# Patient Record
Sex: FEMALE | Race: WHITE | NOT HISPANIC OR LATINO | URBAN - METROPOLITAN AREA
[De-identification: names, ages, dates, MRNs, and addresses within clinical notes are randomized per-mention and may not be internally consistent; named-entity substitution may affect disease eponyms.]

---

## 2023-09-06 ENCOUNTER — HOSPITAL ENCOUNTER (OUTPATIENT)
Dept: DATA CONVERSION | Facility: HOSPITAL | Age: 48
Discharge: HOME | End: 2023-09-06

## 2023-09-06 DIAGNOSIS — Z11.51 ENCOUNTER FOR SCREENING FOR HUMAN PAPILLOMAVIRUS (HPV): ICD-10-CM

## 2023-09-06 DIAGNOSIS — Z01.419 ENCOUNTER FOR GYNECOLOGICAL EXAMINATION (GENERAL) (ROUTINE) WITHOUT ABNORMAL FINDINGS: ICD-10-CM

## 2024-04-10 ENCOUNTER — OFFICE VISIT (OUTPATIENT)
Dept: SPORTS MEDICINE | Facility: CLINIC | Age: 49
End: 2024-04-10
Payer: COMMERCIAL

## 2024-04-10 ENCOUNTER — HOSPITAL ENCOUNTER (OUTPATIENT)
Dept: RADIOLOGY | Facility: CLINIC | Age: 49
Discharge: HOME | End: 2024-04-10
Payer: COMMERCIAL

## 2024-04-10 VITALS
HEART RATE: 88 BPM | BODY MASS INDEX: 28.79 KG/M2 | SYSTOLIC BLOOD PRESSURE: 118 MMHG | DIASTOLIC BLOOD PRESSURE: 78 MMHG | HEIGHT: 68 IN | WEIGHT: 190 LBS

## 2024-04-10 DIAGNOSIS — S76.311A HAMSTRING STRAIN, RIGHT, INITIAL ENCOUNTER: ICD-10-CM

## 2024-04-10 DIAGNOSIS — S76.011A MUSCLE STRAIN OF RIGHT GLUTEAL REGION, INITIAL ENCOUNTER: ICD-10-CM

## 2024-04-10 DIAGNOSIS — S76.011A STRAIN OF FLEXOR MUSCLE OF RIGHT HIP, INITIAL ENCOUNTER: ICD-10-CM

## 2024-04-10 DIAGNOSIS — M21.70 LEG LENGTH DISCREPANCY: ICD-10-CM

## 2024-04-10 DIAGNOSIS — M79.661 PAIN IN RIGHT LOWER LEG: ICD-10-CM

## 2024-04-10 DIAGNOSIS — S76.111A QUADRICEPS STRAIN, RIGHT, INITIAL ENCOUNTER: ICD-10-CM

## 2024-04-10 PROCEDURE — 99203 OFFICE O/P NEW LOW 30 MIN: CPT | Performed by: NURSE PRACTITIONER

## 2024-04-10 PROCEDURE — 99213 OFFICE O/P EST LOW 20 MIN: CPT | Performed by: NURSE PRACTITIONER

## 2024-04-10 PROCEDURE — 73502 X-RAY EXAM HIP UNI 2-3 VIEWS: CPT | Mod: RIGHT SIDE | Performed by: RADIOLOGY

## 2024-04-10 PROCEDURE — 73502 X-RAY EXAM HIP UNI 2-3 VIEWS: CPT | Mod: RT

## 2024-04-10 RX ORDER — MEDROXYPROGESTERONE ACETATE 150 MG/ML
150 INJECTION, SUSPENSION INTRAMUSCULAR
COMMUNITY

## 2024-04-10 RX ORDER — CYCLOBENZAPRINE HCL 10 MG
10 TABLET ORAL 2 TIMES DAILY PRN
COMMUNITY
Start: 2024-04-08

## 2024-04-10 RX ORDER — DOXYCYCLINE 50 MG/1
50 TABLET ORAL DAILY
COMMUNITY

## 2024-04-10 RX ORDER — PREDNISONE 20 MG/1
20 TABLET ORAL DAILY
COMMUNITY
Start: 2024-04-08

## 2024-04-10 ASSESSMENT — PAIN - FUNCTIONAL ASSESSMENT: PAIN_FUNCTIONAL_ASSESSMENT: 0-10

## 2024-04-10 ASSESSMENT — ENCOUNTER SYMPTOMS
MYALGIAS: 1
DEPRESSION: 0
CARDIOVASCULAR NEGATIVE: 1
ARTHRALGIAS: 1
LOSS OF SENSATION IN FEET: 0
RESPIRATORY NEGATIVE: 1
OCCASIONAL FEELINGS OF UNSTEADINESS: 0

## 2024-04-10 ASSESSMENT — PAIN DESCRIPTION - DESCRIPTORS: DESCRIPTORS: ACHING;DISCOMFORT;TIGHTNESS

## 2024-04-10 ASSESSMENT — PAIN SCALES - GENERAL: PAINLEVEL_OUTOF10: 5 - MODERATE PAIN

## 2024-04-10 ASSESSMENT — PATIENT HEALTH QUESTIONNAIRE - PHQ9
SUM OF ALL RESPONSES TO PHQ9 QUESTIONS 1 AND 2: 0
2. FEELING DOWN, DEPRESSED OR HOPELESS: NOT AT ALL
1. LITTLE INTEREST OR PLEASURE IN DOING THINGS: NOT AT ALL

## 2024-04-10 NOTE — LETTER
April 10, 2024     Patient: Kristen Becker   YOB: 1975   Date of Visit: 4/10/2024       To Whom It May Concern:    It is my medical opinion that Kristen Becker may return to work on 4/22 .    If you have any questions or concerns, please don't hesitate to call.         Sincerely,        Milind Sinclair, LAUREN-CNP    CC: No Recipients

## 2024-04-10 NOTE — PATIENT INSTRUCTIONS
May use PRICE therapy as needed.,   Start into Physical Therapy 1-2 times a week for 8-10 weeks with manual therapy as well as dry needling and IASTM,   Stressed the importance of wearing shoes with good stability control to help with the biomechanics affecting the lower legs,   Stressed the importance of wearing full foot insoles to help with the biomechanics affecting the lower legs,   Recommendation over-the-counter calcium 500mg, 3 times a day with vitamin-D3 7629-0167+ milligrams a day with food as well as a daily multivitamin,   Recommendation over-the-counter Move Free for joint health,   May take OTC Tylenol Extra Strength or OTC Tylenol Arthritis, taking one every 6-8 hours with food as needed for pain management.,   Patient advised regarding the risk and/or potential adverse reactions and/or side effects of any prescribed medications along with any over-the-counter medications or any supplements used. Patient advised to seek immediate medical care if any adverse reactions occur. The patient and/or patient(s) parent(s) verbalized their understanding.,   Discussed in detail with the patient to the level of their understanding the possibility in the future of regenerative injections versus corticosteroids injections,   Possibility in future of MRI of RIGHT HIPto rule out tendon vs ligament vs tear vs fracture vs other, MSK to read.,   Possibility in future of MRI of LUMBAR spine to rule out disc herniation vs fracture vs other. MSK to read,   Work Restrictions-Out of work until 4/22 when she may return to work  Follow up 4 weeks

## 2024-04-10 NOTE — PROGRESS NOTES
New patient  History Of Present Illness  Kristen Becker is a 48 y.o. female presenting with RIGHT leg pain. 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 She has been off work this week due to pain. Pt states that her pain has been bothering her for the past two months. States that she woke up one morning and had tightness. Pain is located from her glute and radiates down in to her foot. She feels that it is not sciatica since she feels that the pain is more tightness and also that it can be stress related. It is effecting her gait to the point that she has to take smaller steps. She went [place]  on Monday and was given steroid shots but nothing else. Denies any numbness or tingling. No previous history of leg injury but notes that she has a herniated lumbar disc. Rates current pain as a 5/10 describing it as a constant tightness and ache. Her pain can get up to a 9-10/10 feeling like a sharp stabbing pain.  We discussed treatment options.  Upon exam patient has indications of a quad hamstring low back and glutes strain.  As such after discussion we will start patient into physical therapy and obtain some x-rays to evaluate for underlying pathology.  Discussed work restrictions with patient and we will have her off for the next 2 weeks to rest and then can provide more specific restrictions as needed.  Patient can follow-up in 4 to 6 weeks and we can reevaluate at that time.  Patient verbalizes understanding and agreement with plan of care.    Past Medical History  She has a past medical history of Carpal tunnel syndrome, bilateral and Lumbar herniated disc.    She has no past medical history of Spondylolisthesis.    Surgical History  She has a past surgical history that includes Carpal tunnel release.     Social History  She reports that she has never smoked. She has never used smokeless tobacco. She reports that she does not drink alcohol and does not use drugs.    Family History  Family History   Problem  "Relation Name Age of Onset    Colon cancer Mother      Lymphoma Father          Allergies  Patient has no known allergies.    Review of Systems  Review of Systems   Respiratory: Negative.     Cardiovascular: Negative.    Musculoskeletal:  Positive for arthralgias and myalgias.   All other systems reviewed and are negative.       Last Recorded Vitals  /78 (BP Location: Right arm, Patient Position: Sitting, BP Cuff Size: Adult)   Pulse 88   Ht 1.727 m (5' 8\")   Wt 86.2 kg (190 lb)   BMI 28.89 kg/m²      Examination:  Right Hip  Edema: Positive.   Ecchymosis/Bruising: Negative.   Percussion Test: Negative.   Tuning Fork Test: Negative.   Orientation: Symmetrical.     ROM:   Positive Internal Rotation (30-35 degrees) decreased, causes pain  Positive External Rotation (45-60 degrees) decreased, causes pain  Positive Flexion (120 degrees) decreased, causes pain  Positive Extension (15-30 degrees) decreased, causes pain  Positive ABduction (45-50 degrees) decreased, causes pain  Positive ADduction (20-30 degrees) decreased, causes pain  Positive Decreased Sacral Flexion and Sacral Extension.            Muscle Strength:   Positive +4/+5 Hamstring Flexion  Positive +3/+5 Quadricep Extension         Positive +3/+5 Hip Flexion Causes pain  Positive +3/+5Hip Extension Causes pain                  Positive +4/+5 Hip ABduction away from body Causes pain  Positive +3/+5 Hip ADduction towards body Causes pain          Positive +3/+5 Hip Internal Rotation at 90 Degrees Causes pain  Positive +3/+5 Hip External Rotation at 90 Degrees Causes pain                  Positive +3/+5 Hip Internal Rotation at 0 Degrees Causes pain  Positive +3/+5 Hip External Rotation at 0 Degrees Causes pain    DTR/Neurological:  Sensation Intact, 2-Point Discrimination: Negative.            Palpation:  Positive  tender to palpation over Right  Hip Flexor and Hip Joint           Vascular:   Negative: Normal Pulses   +2/+4, Femoral Artery, DP, " PT  Capillary Refill < 2 seconds.            Function Tests:  Test for Rectus Femoris Contracture: Negative  Hip Extension Test: Positive    Iliotibial Tract Test: Negative  Atul :  Positive    Atul Test:  Positive    Hailey Test: Negative             Hip/Pelvis - Flexibility:  Hamstring Positive   Hip Flexor  Positive    IT-Band  Positive          Hip/Pelvis - Hip Contractures:  Finger Tip Test: Negative.   Atul Test: Negative.   Atul  Test: Negative.   Piriformis Test: Negative.     Hip/Pelvis - Hip Dysplasia:  Trochanter Irritation Sign: Negative.   Galeazzi Test: Negative.   Kalchschmidt Test: Negative.            Hip/Pelvis - Impingement/Labrum:  BING Test:  Positive         FADIR Test:  Positive     Hip Scouring Test:  Negative    Carson's Test:   Negative    Posterior Labrum (Posterior Margin) Test:  Negative      Posterior Impingement (Posterior Labrum) [Torque] Test:  Negative     Anterior Impingement (Anterior Labrum) Test: Negative  Psoas Sign:  Negative                 Hip/Pelvis - IT Band Syndrome:  Hailey's (Iliotibial Tract) Test: Negative.   Yinka Compression Test: Negative.   J-Sign: Negative.            Hip/Pelvis - SFE:  Drehmann Test: Negative.            Hip/Pelvis - Trochanteric/Pelvis Muscle Function:  Trendelenburg/Duchenne Sign: Negative.   Duchenne Sign: Negative.      Examination:  Right Lumbar Spine  Edema: Negative.   TART Findings: Positive  Tissue Texture Changes, Asymmetry, Restriction, Tenderness paraspinal muscles lower lumbar spine.   Ecchymosis/Bruising: Negative.   Percussion Test (LUMBAR): Negative.   Tuning Fork Test (LUMBAR): Negative.   Percussion (Sacrum): Negative.   Tuning Fork (Sacrum): Negative.     Orientation:  Orientation (LUMBAR): Positive  Decreased Lumbar Lordosis due to muscle spasms.   Orientation (Sacrum):  Positive  Decreased Sacral Flexion/Extension.     ROM (LUMBAR):   Positive  Decreased due to pain, Forward Flexion, Extension, Lateral Bending  (Side Bending), and Twisting (Rotation).     Sacrum:  Standing Flexion Test: Negative   Seated Flexion Test: Negative  Spring Test: Negative   Sacral Somatic Dysfunction: Negative  Hip Flexor Tightness: Negative  Hamstring Tightness: Negative    Muscle Strength:   Positive +4/+5 Hamstring Flexion  Positive +3/+5 Quadricep Extension         Positive +3/+5 Hip Flexion Causes pain  Positive +3/+5Hip Extension Causes pain                  Positive +4/+5 Hip ABduction away from body Causes pain  Positive +3/+5 Hip ADduction towards body Causes pain          Positive +3/+5 Hip Internal Rotation at 90 Degrees Causes pain  Positive +3/+5 Hip External Rotation at 90 Degrees Causes pain                  Positive +3/+5 Hip Internal Rotation at 0 Degrees Causes pain  Positive +3/+5 Hip External Rotation at 0 Degrees Causes pain           DTR/Neurological: 2-Point Discrimination:  Negative,Toe Walk normal,Heel Walk normal   +2/+4 Patellar Reflex (L-4)  +2/+4 Posterior Tibialis and Medial Hamstrings Reflex (L5)  +2/+4 Achilles Reflex (S-1).            Sensation/Neurological Lumbar:   Negative Sensation Intact, 2-Point Discrimination    Negative L1: Low back, hips, and groin  Negative L2: Low back and front of inside of upper leg/thigh  Negative L3: Low back and front of upper leg/thigh  Negative L4: Low back, front of upper leg/thigh, front of lower leg/calf, front of medial area of knee, and inside of ankle  Negative L5: Low back, front and lateral knee, front and outside of lower leg/calf, top and bottom of foot and first four toes especially big toe.            Sensation/Neurological Sacrum:   Negative  Sensation Intact, 2 -Point Discrimination Test:    Negative S1: low back, back of upper leg/thigh, back and inside of lower leg, calf and little toe  Negative S2: Buttocks, genitals, back of upper leg/thigh and calves  Negative S3: Buttocks and genitals  Negative S4: Buttocks  Negative S5: Buttocks.     Sensation/Neurological  Coccygeal:   Negative Sensation Intact, 2-Point Discrimination:    Negative Coccyx: Buttocks and area of tailbone.     Palpation: Positive  Tender to Palpation over the Right Lumbar Spine as well as the Paraspinal Musculature,           Vascular:   Capillary Refill < 2 seconds  +2/+4Carotid  +2/+4 Dorsalis Pedis  +2/+4 Posterior Tibial.                Low Back-Disc Injury:  Valsalva Maneuver: Negative.   Fermoral Nerve Traction Test: Negative  Slump Test:  Negative  Cross Test Seated:  Negative  Seated Straight Leg Raise: Negative  Laseague Sign: Negative  Laseague Differential Test: Negative  Laseague Drop Test: Negative  Seated Laseague Test: Negative  Reverse Laseague Test:  Negative  Tip Toe Heel Walking Test: Negative.   Ryan Prone Knee Flexion Test: Negative       Low Back-SI Joint:  Three-Phase Hyperextension Test: Negative  Yeoman Test: Negative.   Sacroilliac Stress Test: Negative  Abduction Stress Test: Negative       Low Back-Spondy:  Stork Test: Negative.   Sphinx Test: Negative.   Modified Sphinx Test: Negative.            Leg Length:  Leg Length Supine: Positive Will verify with standing erect pelvis xray   Leg Length Supine to Seated (Derbolowsky Sign): Positive Will verify with standing erect pelvis xray     Feet/Foot:   Positive  BILATERAL Valgus foot     Imaging and Diagnostics Review:  XRAYs ordered during today's visit    Assessment   1. Pain in right lower leg    2. Muscle strain of right gluteal region, initial encounter    3. Quadriceps strain, right, initial encounter    4. Hamstring strain, right, initial encounter    5. Strain of flexor muscle of right hip, initial encounter    6. Leg length discrepancy        Plan   Treatment or Intervention:  May use PRICE therapy as needed.,   Start into Physical Therapy 1-2 times a week for 8-10 weeks with manual therapy as well as dry needling and IASTM,   Stressed the importance of wearing shoes with good stability control to help with the  biomechanics affecting the lower legs,   Stressed the importance of wearing full foot insoles to help with the biomechanics affecting the lower legs,   Recommendation over-the-counter calcium 500mg, 3 times a day with vitamin-D3 5292-4518+ milligrams a day with food as well as a daily multivitamin,   Recommendation over-the-counter Move Free for joint health,   May take OTC Tylenol Extra Strength or OTC Tylenol Arthritis, taking one every 6-8 hours with food as needed for pain management.,   Patient advised regarding the risk and/or potential adverse reactions and/or side effects of any prescribed medications along with any over-the-counter medications or any supplements used. Patient advised to seek immediate medical care if any adverse reactions occur. The patient and/or patient(s) parent(s) verbalized their understanding.,   Discussed in detail with the patient to the level of their understanding the possibility in the future of regenerative injections versus corticosteroids injections,   Possibility in future of MRI of RIGHT to rule out tendon vs ligament vs tear vs fracture vs other, MSK to read.,   Possibility in future of MRI of RIGHT KNEE to rule out tendon vs ligament vs tear vs fracture vs other, MSK to read.,   Possibility in future of MRI of LUMBAR spine to rule out disc herniation vs fracture vs other. MSK to read,     Diagnostic studies:      Activity Instructions, Restrictions, and Accommodations:  Out of work for two weeks.    Consultations/Referrals:  Physical therapy    Follow-up:  Follow up 4 weeks  Total appointment time _30_ minutes. Greater than 50% spent counseling patient on results of physical exam, treatment options as well as reasons for ordered imaging and anticipated treatment for possible results, need for PT and expected outcomes, as well as discussing possible medications.       FELECIA CATES on 4/10/24 at 10:20 AM.     Milind Sinclair CNP

## 2024-04-16 ENCOUNTER — EVALUATION (OUTPATIENT)
Dept: PHYSICAL THERAPY | Facility: CLINIC | Age: 49
End: 2024-04-16
Payer: COMMERCIAL

## 2024-04-16 DIAGNOSIS — S76.111A QUADRICEPS STRAIN, RIGHT, INITIAL ENCOUNTER: ICD-10-CM

## 2024-04-16 DIAGNOSIS — S76.311A HAMSTRING STRAIN, RIGHT, INITIAL ENCOUNTER: ICD-10-CM

## 2024-04-16 DIAGNOSIS — S76.011A STRAIN OF FLEXOR MUSCLE OF RIGHT HIP, INITIAL ENCOUNTER: ICD-10-CM

## 2024-04-16 DIAGNOSIS — M79.661 PAIN IN RIGHT LOWER LEG: ICD-10-CM

## 2024-04-16 DIAGNOSIS — S76.011A MUSCLE STRAIN OF RIGHT GLUTEAL REGION, INITIAL ENCOUNTER: ICD-10-CM

## 2024-04-16 PROCEDURE — 97110 THERAPEUTIC EXERCISES: CPT | Mod: GP | Performed by: PHYSICAL THERAPIST

## 2024-04-16 PROCEDURE — 97161 PT EVAL LOW COMPLEX 20 MIN: CPT | Mod: GP | Performed by: PHYSICAL THERAPIST

## 2024-04-16 PROCEDURE — 97014 ELECTRIC STIMULATION THERAPY: CPT | Mod: GP | Performed by: PHYSICAL THERAPIST

## 2024-04-16 NOTE — PROGRESS NOTES
Physical Therapy Evaluation and Treatment    Patient Name: Kristen Becker  MRN: [id number]  Evaluation Date: 4/16/2024  Time Calculation  Start Time: 1030  Stop Time: 1120  Time Calculation (min): 50 min    Current Problem:   1. Pain in right lower leg  Referral to Physical Therapy    Follow Up In Physical Therapy      2. Muscle strain of right gluteal region, initial encounter  Referral to Physical Therapy      3. Quadriceps strain, right, initial encounter  Referral to Physical Therapy      4. Hamstring strain, right, initial encounter  Referral to Physical Therapy      5. Strain of flexor muscle of right hip, initial encounter  Referral to Physical Therapy          Subjective  /General:     Patient reported hx of current condition/injury: Insidious onset of pain 2 weeks ago with progressive worsening. Has had some prior back issues but nothing like this. Saw Dr and x-ray done. Now referred for PT.    Precautions:  Precautions  Precautions Comment: none  Pain:  Pain Score:  (Pain floats 2-8/10. Not sure of any specific cause for pain or what makes it feel better. does get bworse as the day goes along.)  Pain Location:  (Rt glut and down posterior RLE to heel)  Home Living:   Limitation standing and overhead activities at home. 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    Prior Function Per Pt/Caregiver Report:   Indep home and work    OBJECTIVE:  Objective   Posture:   Good standing posture, Pelvis symm and leg length apears =.  Range of Motion:   Trunk flex and RSB 50% limited by tightness. LSB and ext WFL   Strength:   Grossly 5/5 BLE's except hip ext 4-, knee flex 4, abs 3+  Flexibility:   Tight quads and hip IR's/add's very tight calves and hip flexors  Palpation:   No specific tenderness but feels tight RT. Hamstring area.  Special Tests:   (-) SLR lt but (+) rt, jorge and S-I comp/distraction  Gait:   normal      Outcome Measures:  50% impairment on LEFS     Treatments:  Started  postural educ, anatomy review, E-stim with heat for pain and exer program: PPT, supine sktc/hs stretches and prone glut sets.    HEP to be completed daily, exercises include:  All new exer today.    OP EDUCATION:  Outpatient Education  Individual(s) Educated: Patient  Education Provided: Anatomy, Body Mechanics, Home Exercise Program, Posture  Diagnosis and Precautions: Possible HNP vs DDD  Patient Response to Education: Patient/Caregiver Verbalized Understanding of Information, Patient/Caregiver Performed Return Demonstration of Exercises/Activities, Patient/Caregiver Asked Appropriate Questions    Assessment  PT Assessment Results: Decreased strength, Decreased range of motion, Decreased endurance, Pain  Rehab Prognosis: Good  Evaluation/Treatment Tolerance: Patient tolerated treatment well    Pt is a 48 y.o. Female who presents with impairments of trunk and LE's. These impairments have led to functional limitations including Home, work and recreational activities. Pt would benefit from skilled physical therapy intervention to improve above impairments and facilitate return to function.    Plan  Treatment/Interventions: Education/ Instruction, Electrical stimulation, Hot pack, Manual therapy, Mechanical traction, Neuromuscular re-education, Self care/ home management, Therapeutic exercises, Ultrasound  PT Plan: Skilled PT  PT Frequency: 2 times per week  Duration: 8  Rehab Potential: Good  Plan of Care Agreement: Patient    Goals:  STG:  Improved postural awareness           Home function at least 65%           Tolerate 1/2 day at work without pain  LTG:  Home function at least 90%          Tolerate full work day           Indep with a HEP

## 2024-04-18 ENCOUNTER — TREATMENT (OUTPATIENT)
Dept: PHYSICAL THERAPY | Facility: CLINIC | Age: 49
End: 2024-04-18
Payer: COMMERCIAL

## 2024-04-18 DIAGNOSIS — M79.661 PAIN IN RIGHT LOWER LEG: ICD-10-CM

## 2024-04-18 PROCEDURE — 97014 ELECTRIC STIMULATION THERAPY: CPT | Mod: GP | Performed by: PHYSICAL THERAPIST

## 2024-04-18 PROCEDURE — 97110 THERAPEUTIC EXERCISES: CPT | Mod: GP | Performed by: PHYSICAL THERAPIST

## 2024-04-18 ASSESSMENT — PAIN SCALES - GENERAL: PAINLEVEL_OUTOF10: 1

## 2024-04-18 NOTE — PROGRESS NOTES
Physical Therapy Treatment    Patient Name: Kristen Becker  MRN: [id number]  Today's Date: 2024  Time Calculation  Start Time: 1255  Stop Time: 1340  Time Calculation (min): 45 min    Visit Number:  2 / 10   Visit Authorized:       Current Problem  1. Pain in right lower leg  Follow Up In Physical Therapy          Precautions  Precautions  Precautions Comment: none    Pain  Pain Score: 1    Subjective/General     Feels a little better but still having some pain. HEP good. Supposed to RTW next week but not sure if she is ready.    Objective  Gait normal    Treatment:  Therapeutic Exercise  Therapeutic Exercise Activity 1: stretching/stabilization (Con't with exer as previous and added supine piriformis, prone quad and standing HS/psoas/calf stretches, add. alicia, supine clam shells and prone knee press up.)    Modalities  Modality 1: Untimed ESU - Electrical Stimulation Unattended  Modality 2: Untimed Hotpack    OP EDUCATION:  All new exer's added to HEP     Assessment:   Progrerssed exer without pain increase. Advised pt. To contact  In regards to RTW issues.    End of session pain ratin/10    Plan:     Continue with current POC with the following changes , start strength work    Assessment of current progress against goals:  Symptomatic relief with treatment  Goals:    sleep 5 hrs

## 2024-04-23 ENCOUNTER — APPOINTMENT (OUTPATIENT)
Dept: PHYSICAL THERAPY | Facility: CLINIC | Age: 49
End: 2024-04-23
Payer: COMMERCIAL

## 2024-04-23 ENCOUNTER — DOCUMENTATION (OUTPATIENT)
Dept: PHYSICAL THERAPY | Facility: CLINIC | Age: 49
End: 2024-04-23

## 2024-04-23 NOTE — PROGRESS NOTES
Physical Therapy                 Therapy Communication Note    Patient Name: Kristen Becker  MRN: [id number]  Today's Date: 4/23/2024     Discipline: Physical Therapy    Missed Visit Reason: can't make it in today.     Missed Time: Cancel    Comment:

## 2024-04-25 ENCOUNTER — TREATMENT (OUTPATIENT)
Dept: PHYSICAL THERAPY | Facility: CLINIC | Age: 49
End: 2024-04-25
Payer: COMMERCIAL

## 2024-04-25 DIAGNOSIS — M79.661 PAIN IN RIGHT LOWER LEG: ICD-10-CM

## 2024-04-25 PROCEDURE — 97110 THERAPEUTIC EXERCISES: CPT | Mod: GP | Performed by: PHYSICAL THERAPIST

## 2024-04-25 PROCEDURE — 97014 ELECTRIC STIMULATION THERAPY: CPT | Mod: GP | Performed by: PHYSICAL THERAPIST

## 2024-04-25 ASSESSMENT — PAIN SCALES - GENERAL: PAINLEVEL_OUTOF10: 1

## 2024-04-25 NOTE — PROGRESS NOTES
Physical Therapy Treatment    Patient Name: Kristen Becker  MRN: [id number]  Today's Date: 2024  Time Calculation  Start Time: 1715  Stop Time: 0  Time Calculation (min): 55 min    Visit Number:  3 / 10   Visit Authorized:  20    Current Problem  1. Pain in right lower leg  Follow Up In Physical Therapy          Precautions  Precautions  Precautions Comment: none    Pain  Pain Score: 1    Subjective/General     Overall better. Went back to work and doing ok but gets pretty sore half way through the day. Saw a Chiro. This week who did some manipulations which seemed to help. HEP ok.    Objective  Gait normal    Treatment:  Therapeutic Exercise  Therapeutic Exercise Activity 1: stretching/stabilization  Therapeutic Exercise Activity 2: strengthening (started lat pulls, seated row, knee ext and flex and bike.)    Modalities  Modality 1: Untimed ESU - Electrical Stimulation Unattended  Modality 2: Untimed Hotpack     Assessment:   Able to increase exer program today without pain increase.    End of session pain ratin/10    Plan:     Continue with current POC with the following changes , progress exer as able.    Assessment of current progress against goals:  Progressing toward functional goals  Goals:   Tolerate full work day.

## 2024-04-26 ENCOUNTER — HOSPITAL ENCOUNTER (OUTPATIENT)
Dept: RADIOLOGY | Facility: CLINIC | Age: 49
Discharge: HOME | End: 2024-04-26
Payer: COMMERCIAL

## 2024-04-26 DIAGNOSIS — M51.26 OTHER INTERVERTEBRAL DISC DISPLACEMENT, LUMBAR REGION: ICD-10-CM

## 2024-04-26 PROCEDURE — 72110 X-RAY EXAM L-2 SPINE 4/>VWS: CPT

## 2024-04-26 PROCEDURE — 72110 X-RAY EXAM L-2 SPINE 4/>VWS: CPT | Performed by: RADIOLOGY

## 2024-05-02 ENCOUNTER — DOCUMENTATION (OUTPATIENT)
Dept: PHYSICAL THERAPY | Facility: CLINIC | Age: 49
End: 2024-05-02
Payer: COMMERCIAL

## 2024-05-02 ENCOUNTER — APPOINTMENT (OUTPATIENT)
Dept: PHYSICAL THERAPY | Facility: CLINIC | Age: 49
End: 2024-05-02
Payer: COMMERCIAL

## 2024-05-02 NOTE — PROGRESS NOTES
Physical Therapy                 Therapy Communication Note    Patient Name: Kristen Becker  MRN: [id number]  Today's Date: 2024     Discipline: Physical Therapy    Missed Visit Reason:     Missed Time: Cancel    Comment:

## 2024-05-03 ENCOUNTER — APPOINTMENT (OUTPATIENT)
Dept: SPORTS MEDICINE | Facility: CLINIC | Age: 49
End: 2024-05-03
Payer: COMMERCIAL

## 2024-05-30 ENCOUNTER — DOCUMENTATION (OUTPATIENT)
Dept: PHYSICAL THERAPY | Facility: CLINIC | Age: 49
End: 2024-05-30
Payer: COMMERCIAL

## 2024-05-30 NOTE — PROGRESS NOTES
Physical Therapy    Discharge Summary    Name: Kristen Becker  MRN: [id number]  : 1975  Date: 24    Discharge Summary: PT    Discharge Information:  3    Therapy Summary: Treatment sessions consisted of pain modalities and initiation of an exer program. At time of last visit she was noting some improvement and was scheduled for RTW. She canceled her next arnol't on  and has not rescheduled since nor reported problems.    Discharge Status: discharge at     Rehab Discharge Reason: Failed to schedule and/or keep follow-up appointment(s), was reporting some improvement.

## 2024-10-23 ENCOUNTER — HOSPITAL ENCOUNTER (OUTPATIENT)
Dept: RADIOLOGY | Facility: HOSPITAL | Age: 49
Discharge: HOME | End: 2024-10-23
Payer: COMMERCIAL

## 2024-10-23 VITALS — WEIGHT: 193 LBS | HEIGHT: 68 IN | BODY MASS INDEX: 29.25 KG/M2

## 2024-10-23 DIAGNOSIS — Z12.31 ENCOUNTER FOR SCREENING MAMMOGRAM FOR MALIGNANT NEOPLASM OF BREAST: ICD-10-CM

## 2024-10-23 PROCEDURE — 77067 SCR MAMMO BI INCL CAD: CPT

## 2024-10-23 PROCEDURE — 77063 BREAST TOMOSYNTHESIS BI: CPT | Performed by: RADIOLOGY

## 2024-10-23 PROCEDURE — 77067 SCR MAMMO BI INCL CAD: CPT | Performed by: RADIOLOGY

## 2024-12-03 ENCOUNTER — TRANSCRIBE ORDERS (OUTPATIENT)
Dept: ORTHOPEDIC SURGERY | Facility: HOSPITAL | Age: 49
End: 2024-12-03
Payer: COMMERCIAL

## 2024-12-03 DIAGNOSIS — M54.50 LOW BACK PAIN, UNSPECIFIED BACK PAIN LATERALITY, UNSPECIFIED CHRONICITY, UNSPECIFIED WHETHER SCIATICA PRESENT: ICD-10-CM

## 2024-12-06 ENCOUNTER — APPOINTMENT (OUTPATIENT)
Dept: ORTHOPEDIC SURGERY | Facility: CLINIC | Age: 49
End: 2024-12-06
Payer: COMMERCIAL

## 2024-12-06 ENCOUNTER — HOSPITAL ENCOUNTER (OUTPATIENT)
Dept: RADIOLOGY | Facility: CLINIC | Age: 49
Discharge: HOME | End: 2024-12-06
Payer: COMMERCIAL

## 2024-12-06 DIAGNOSIS — M51.360 DEGENERATION OF INTERVERTEBRAL DISC OF LUMBAR REGION WITH DISCOGENIC BACK PAIN: Primary | ICD-10-CM

## 2024-12-06 DIAGNOSIS — M54.50 LOW BACK PAIN, UNSPECIFIED BACK PAIN LATERALITY, UNSPECIFIED CHRONICITY, UNSPECIFIED WHETHER SCIATICA PRESENT: ICD-10-CM

## 2024-12-06 PROCEDURE — 72110 X-RAY EXAM L-2 SPINE 4/>VWS: CPT

## 2024-12-06 PROCEDURE — 99203 OFFICE O/P NEW LOW 30 MIN: CPT | Performed by: ORTHOPAEDIC SURGERY

## 2024-12-09 NOTE — PROGRESS NOTES
HPI:Kristen Becker is a pleasant 49-year-old woman who comes in today with complaints of low back pain.  She gets intermittent flares of that pain.  She denies leg pain.  No constitutional symptoms.  She has seen a chiropractor in the past has done physical therapy and even had pain management.      ROS:  Reviewed on EMR and patient intake sheet.    PMH/SH:  Reviewed on EMR and patient intake sheet.    Exam:  Physical Exam    Constitutional: Well appearing; no acute distress  Eyes: pupils are equal and round  Psych: normal affect  Respiratory: non-labored breathing  Cardiovascular: regular rate and rhythm  GI: non-distended abdomen  Musculoskeletal: no pain with range of motion of the hips bilaterally  Neurologic: [5]/5 strength in the lower extremities bilaterally]; [negative] straight leg raise    Radiology:     MRI personally reviewed.  There is no evidence of stenosis.  Mild multilevel disc degeneration.    Diagnosis:    Degenerative disc disease    Assessment and Plan:   49-year-old woman with chronic discogenic back pain.  The natural history of disc degeneration was discussed at length.  I stressed that the natural history of disc degeneration is usually favorable, with pain and disability decreasing over time when treated with a multi-modal, focused rehabilitation program.  I encouraged, therefore, continued non-operative care, focusing on core strengthening, regular cardiovascular exercise, abstaining from nicotine products, and maintaining a healthy weight.    No need for surgical intervention at this time.  I can see her back as needed.    The patient was in agreement with the plan. At the end of the visit today, the patient felt that all questions had been answered satisfactorily.  The patient was pleased with the visit and very appreciative for the care rendered.     Thank you very much for the kind referral.  It is a privilege, and a pleasure, to partner with you in the care of your patients.  I  would be delighted to assist you with any further consultations as needed.          Cornel Baxter MD    Chief of Spine Surgery, [facility]  Director of Spine Service, [facility]  , Department of Orthopaedics  [employer]  [address] [place] [address]  P: [phone]  [email or web address]    This note was dictated with voice recognition software.  It has not been proofread for grammatical errors, typographical mistakes or other semantic inconsistencies.

## 2025-01-13 ENCOUNTER — OFFICE VISIT (OUTPATIENT)
Dept: PRIMARY CARE | Facility: CLINIC | Age: 50
End: 2025-01-13
Payer: COMMERCIAL

## 2025-01-13 VITALS
WEIGHT: 193 LBS | HEART RATE: 84 BPM | HEIGHT: 67 IN | BODY MASS INDEX: 30.29 KG/M2 | SYSTOLIC BLOOD PRESSURE: 122 MMHG | DIASTOLIC BLOOD PRESSURE: 78 MMHG

## 2025-01-13 DIAGNOSIS — R39.15 URGENCY OF URINATION: ICD-10-CM

## 2025-01-13 DIAGNOSIS — M25.519 SHOULDER PAIN, UNSPECIFIED CHRONICITY, UNSPECIFIED LATERALITY: ICD-10-CM

## 2025-01-13 DIAGNOSIS — G89.29 CHRONIC RIGHT-SIDED LOW BACK PAIN WITH RIGHT-SIDED SCIATICA: Primary | ICD-10-CM

## 2025-01-13 DIAGNOSIS — M54.41 CHRONIC RIGHT-SIDED LOW BACK PAIN WITH RIGHT-SIDED SCIATICA: Primary | ICD-10-CM

## 2025-01-13 PROCEDURE — 99213 OFFICE O/P EST LOW 20 MIN: CPT | Performed by: FAMILY MEDICINE

## 2025-01-13 PROCEDURE — 1036F TOBACCO NON-USER: CPT | Performed by: FAMILY MEDICINE

## 2025-01-13 PROCEDURE — 3008F BODY MASS INDEX DOCD: CPT | Performed by: FAMILY MEDICINE

## 2025-01-13 RX ORDER — ACETAMINOPHEN 500 MG
TABLET ORAL DAILY
COMMUNITY

## 2025-01-13 RX ORDER — NAPROXEN 500 MG/1
500 TABLET ORAL 2 TIMES DAILY PRN
COMMUNITY

## 2025-01-13 RX ORDER — MULTIVITAMIN/IRON/FOLIC ACID 18MG-0.4MG
1 TABLET ORAL DAILY
COMMUNITY

## 2025-01-13 ASSESSMENT — ENCOUNTER SYMPTOMS
DIARRHEA: 0
CONSTIPATION: 0
HEADACHES: 0
BACK PAIN: 1
SHORTNESS OF BREATH: 0

## 2025-01-13 ASSESSMENT — PATIENT HEALTH QUESTIONNAIRE - PHQ9
1. LITTLE INTEREST OR PLEASURE IN DOING THINGS: NOT AT ALL
2. FEELING DOWN, DEPRESSED OR HOPELESS: NOT AT ALL
SUM OF ALL RESPONSES TO PHQ9 QUESTIONS 1 AND 2: 0

## 2025-01-13 ASSESSMENT — PAIN SCALES - GENERAL: PAINLEVEL_OUTOF10: 2

## 2025-01-13 NOTE — PROGRESS NOTES
"Subjective   Patient ID: Kristen Becker is a 49 y.o. female who presents for Establish Care and Back Pain.    HPI   She presents today for a consultation to consider acupuncture.  She has 5 bulging discs in the back, with symptoms worse in the last week. She does see a chiropractor. In April she started to have pain radiating into the lower right back. Chiropractic care helped, but continued to be a problem. Has seen pain management. Did two injections that did help, but wearing off now.   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  This all started in the fall 2023.  She does not recall anything emotional happening at that time or in April.    In the summertime, she drinks a lot of water, she will have a urgency of urination where she feels that she has to go or can hold it.  Has been a problem over the winter as she has not been drinking as much.  She also has some pain in the shoulder at times.      Review of Systems   Respiratory:  Negative for shortness of breath.    Cardiovascular:  Negative for chest pain.   Gastrointestinal:  Negative for constipation and diarrhea.   Musculoskeletal:  Positive for back pain.   Neurological:  Negative for headaches.       Objective   /78   Pulse 84   Ht 1.702 m (5' 7\")   Wt 87.5 kg (193 lb)   BMI 30.23 kg/m²     Physical Exam  Constitutional:       Appearance: Normal appearance. She is not ill-appearing.   Pulmonary:      Effort: Pulmonary effort is normal.   Musculoskeletal:      Comments: She does have pain with palpation in the lower back, sacral area on the right back.   Neurological:      Mental Status: She is alert.         Assessment/Plan   Diagnoses and all orders for this visit:  Chronic right-sided low back pain with right-sided sciatica  Urgency of urination  Shoulder pain, unspecified chronicity, unspecified laterality  I demonstrated the 4 energy raymond to her which I want to do every day for 4 to 5 minutes each.  I recommend Samuel Bustos " and Mary Moore.  I wanted to follow-up not only the back pain but also the urinary symptoms and the shoulder pain is any of these can get better first but are indication if things are improving.  She can schedule acupuncture but I want her to get least the weekend of doing the energy raymond and the herbs for him.  We discussed that we can then treat that weekly and see how she does.  We also discussed that emotions are very often the root cause of some of these issues.  Specifically with the back issues, can be fear or worry.  She will investigate her life or anything of that nature.

## 2025-01-13 NOTE — PATIENT INSTRUCTIONS
Will treat energetically.   Do the 4 Energy Blanchard every day for 4-5 minutes each.   Take Samuel Bustos and Mary Moore, 3 of each twice a day. You can get these at [facility].   Schedule acupuncture.   Follow not only the back pain, but the urine issue and the shoulder pain. Those should improve as well.   Be sure to let go of any worry in your life.

## 2025-01-24 ENCOUNTER — APPOINTMENT (OUTPATIENT)
Dept: PRIMARY CARE | Facility: CLINIC | Age: 50
End: 2025-01-24
Payer: COMMERCIAL

## 2025-01-24 PROBLEM — M79.672 FOOT PAIN, LEFT: Status: ACTIVE | Noted: 2024-11-18

## 2025-01-24 PROBLEM — M54.50 CHRONIC LOW BACK PAIN: Status: ACTIVE | Noted: 2024-04-08

## 2025-01-24 PROBLEM — G89.29 CHRONIC LOW BACK PAIN: Status: ACTIVE | Noted: 2024-04-08

## 2025-02-07 ENCOUNTER — APPOINTMENT (OUTPATIENT)
Dept: PRIMARY CARE | Facility: CLINIC | Age: 50
End: 2025-02-07
Payer: COMMERCIAL

## 2025-02-11 ENCOUNTER — TELEPHONE (OUTPATIENT)
Dept: PRIMARY CARE | Facility: CLINIC | Age: 50
End: 2025-02-11
Payer: COMMERCIAL

## 2025-02-11 NOTE — TELEPHONE ENCOUNTER
Onset 2 weeks ago with a cough and headache.  She said that the congestion in her head is not breaking up and she is still coughing.  She said that she is using mucinex and it is not really helping.  She is asking if there is something else she can use to help with the congestion  She uses [facility]

## 2025-02-12 ENCOUNTER — OFFICE VISIT (OUTPATIENT)
Dept: PRIMARY CARE | Facility: CLINIC | Age: 50
End: 2025-02-12
Payer: COMMERCIAL

## 2025-02-12 VITALS
HEART RATE: 60 BPM | WEIGHT: 195.8 LBS | BODY MASS INDEX: 30.67 KG/M2 | SYSTOLIC BLOOD PRESSURE: 126 MMHG | DIASTOLIC BLOOD PRESSURE: 70 MMHG

## 2025-02-12 DIAGNOSIS — J01.90 ACUTE SINUSITIS, RECURRENCE NOT SPECIFIED, UNSPECIFIED LOCATION: Primary | ICD-10-CM

## 2025-02-12 PROCEDURE — 99213 OFFICE O/P EST LOW 20 MIN: CPT | Performed by: FAMILY MEDICINE

## 2025-02-12 PROCEDURE — 1036F TOBACCO NON-USER: CPT | Performed by: FAMILY MEDICINE

## 2025-02-12 RX ORDER — AMOXICILLIN AND CLAVULANATE POTASSIUM 875; 125 MG/1; MG/1
875 TABLET, FILM COATED ORAL 2 TIMES DAILY
Qty: 14 TABLET | Refills: 0 | Status: SHIPPED | OUTPATIENT
Start: 2025-02-12 | End: 2025-02-19

## 2025-02-12 ASSESSMENT — PAIN SCALES - GENERAL: PAINLEVEL_OUTOF10: 0-NO PAIN

## 2025-02-12 NOTE — PROGRESS NOTES
Subjective   Patient ID: Kristen Becker is a 49 y.o. female who presents for Cough and Nasal Congestion (X 2 weeks).    HPI   She presents today with 2 weeks of nasal congestion.  She has a little of a cough 2.  His sinus congestion with green discharge.  No fever currently.  She just feels he cannot really clear it.    Review of Systems    Objective   /70   Pulse 60   Wt 88.8 kg (195 lb 12.8 oz)   BMI 30.67 kg/m²     Physical Exam  Vitals reviewed.   Constitutional:       Appearance: She is not toxic-appearing.   HENT:      Right Ear: Tympanic membrane and ear canal normal.      Left Ear: Tympanic membrane and ear canal normal.      Nose: Congestion and rhinorrhea present.      Mouth/Throat:      Mouth: Mucous membranes are moist.      Pharynx: Oropharynx is clear.   Eyes:      Conjunctiva/sclera: Conjunctivae normal.   Pulmonary:      Effort: Pulmonary effort is normal. No respiratory distress.      Breath sounds: No wheezing or rhonchi.   Musculoskeletal:      Cervical back: No rigidity.   Lymphadenopathy:      Cervical: No cervical adenopathy.   Neurological:      Mental Status: She is alert.   Psychiatric:         Mood and Affect: Mood normal.         Assessment/Plan   Diagnoses and all orders for this visit:  Acute sinusitis, recurrence not specified, unspecified location  -     amoxicillin-pot clavulanate (Augmentin) 875-125 mg tablet; Take 1 tablet (875 mg) by mouth 2 times a day for 7 days.  She will let me know she is improving over the next 2 or 3 days.

## 2025-02-14 ENCOUNTER — TELEPHONE (OUTPATIENT)
Dept: PRIMARY CARE | Facility: CLINIC | Age: 50
End: 2025-02-14
Payer: COMMERCIAL

## 2025-02-14 DIAGNOSIS — J01.90 ACUTE SINUSITIS, RECURRENCE NOT SPECIFIED, UNSPECIFIED LOCATION: Primary | ICD-10-CM

## 2025-02-14 RX ORDER — PREDNISONE 20 MG/1
40 TABLET ORAL DAILY
Qty: 10 TABLET | Refills: 0 | Status: SHIPPED | OUTPATIENT
Start: 2025-02-14 | End: 2025-02-19

## 2025-02-14 NOTE — TELEPHONE ENCOUNTER
She was seen on Wednesday and was told to call back if not any better.  She said that she is still completely stuffed up, headache, coughing. She has taken 4 doses of the abx and is not much better  She uses [facility] in [place]

## 2025-03-04 ENCOUNTER — OFFICE VISIT (OUTPATIENT)
Dept: URGENT CARE | Age: 50
End: 2025-03-04
Payer: COMMERCIAL

## 2025-03-04 VITALS
BODY MASS INDEX: 29.55 KG/M2 | OXYGEN SATURATION: 100 % | WEIGHT: 195 LBS | RESPIRATION RATE: 14 BRPM | TEMPERATURE: 98.9 F | HEIGHT: 68 IN | HEART RATE: 96 BPM | DIASTOLIC BLOOD PRESSURE: 87 MMHG | SYSTOLIC BLOOD PRESSURE: 145 MMHG

## 2025-03-04 DIAGNOSIS — R03.0 ELEVATED BLOOD PRESSURE READING IN OFFICE WITHOUT DIAGNOSIS OF HYPERTENSION: ICD-10-CM

## 2025-03-04 DIAGNOSIS — M54.50 ACUTE RIGHT-SIDED LOW BACK PAIN WITHOUT SCIATICA: Primary | ICD-10-CM

## 2025-03-04 PROCEDURE — 3008F BODY MASS INDEX DOCD: CPT

## 2025-03-04 PROCEDURE — 1036F TOBACCO NON-USER: CPT

## 2025-03-04 PROCEDURE — 96372 THER/PROPH/DIAG INJ SC/IM: CPT

## 2025-03-04 PROCEDURE — 99213 OFFICE O/P EST LOW 20 MIN: CPT

## 2025-03-04 RX ORDER — KETOROLAC TROMETHAMINE 30 MG/ML
30 INJECTION, SOLUTION INTRAMUSCULAR; INTRAVENOUS ONCE
Status: COMPLETED | OUTPATIENT
Start: 2025-03-04 | End: 2025-03-04

## 2025-03-04 RX ORDER — PREDNISONE 50 MG/1
50 TABLET ORAL DAILY
Qty: 5 TABLET | Refills: 0 | Status: SHIPPED | OUTPATIENT
Start: 2025-03-04 | End: 2025-03-09

## 2025-03-04 RX ORDER — LIDOCAINE 50 MG/G
1 PATCH TOPICAL DAILY
Qty: 15 PATCH | Refills: 0 | Status: SHIPPED | OUTPATIENT
Start: 2025-03-04 | End: 2025-03-19

## 2025-03-04 RX ORDER — DEXAMETHASONE SODIUM PHOSPHATE 10 MG/ML
10 INJECTION INTRAMUSCULAR; INTRAVENOUS ONCE
Status: COMPLETED | OUTPATIENT
Start: 2025-03-04 | End: 2025-03-04

## 2025-03-04 RX ADMIN — DEXAMETHASONE SODIUM PHOSPHATE 10 MG: 10 INJECTION INTRAMUSCULAR; INTRAVENOUS at 16:25

## 2025-03-04 RX ADMIN — KETOROLAC TROMETHAMINE 30 MG: 30 INJECTION, SOLUTION INTRAMUSCULAR; INTRAVENOUS at 16:26

## 2025-03-04 ASSESSMENT — ENCOUNTER SYMPTOMS: BACK PAIN: 1

## 2025-03-04 NOTE — LETTER
March 4, 2025     Patient: Kristen Becker   YOB: 1975   Date of Visit: 3/4/2025       To Whom It May Concern:    Kristen Becker was seen in my clinic on 3/4/2025 at 3:35 pm. Please excuse Kristen for her absence from work on this day to make the appointment.  Can return to work on Friday, 3/7/2025.    If you have any questions or concerns, please don't hesitate to call.         Sincerely,         Mukesh Palafox PA-C        CC: No Recipients

## 2025-03-04 NOTE — PROGRESS NOTES
"Subjective   Patient ID: Kristen Becker is a 49 y.o. female. They present today with a chief complaint of Back Pain (Lower back pain right side ).    History of Present Illness  49-year-old female presents urgent care for complaint of back pain right side worsening over the past 3 to 4 days.  States she has chronic back pain feels like previous flares.  States she does see pain management.  Denies F/C/N/V/sweats/Cp/SOB/ab pain, trauma, unintentional weight loss/hx of cancer, saddle anesthesia, bowel/bladder incontinence/retention, hx of IV drug use, alcohol abuse, DM.  Gave Toradol and Decadron IM in the urgent care today.  Prescribed prednisone and lidocaine patches.  Patient states muscle relaxants do not help.  Educated on supportive care.  Follow-up PCP in 5 to 7 days for recheck and to recheck blood pressure.  Return/ER precautions.  Patient agrees with plan.      Back Pain        Past Medical History  Allergies as of 03/04/2025    (No Known Allergies)       (Not in a hospital admission)       Past Medical History:   Diagnosis Date    Carpal tunnel syndrome, bilateral     Lumbar herniated disc        Past Surgical History:   Procedure Laterality Date    CARPAL TUNNEL RELEASE          reports that she has never smoked. She has never used smokeless tobacco. She reports that she does not drink alcohol and does not use drugs.    Review of Systems  Review of Systems   Musculoskeletal:  Positive for back pain.   All other systems reviewed and are negative.                                 Objective    Vitals:    03/04/25 1551   BP: 145/87   BP Location: Right arm   Patient Position: Standing   BP Cuff Size: Adult   Pulse: 96   Resp: 14   Temp: 37.2 °C (98.9 °F)   TempSrc: Oral   SpO2: 100%   Weight: 88.5 kg (195 lb)   Height: 1.727 m (5' 8\")     No LMP recorded. Patient has had an injection.    Physical Exam  Vitals reviewed. Exam conducted with a chaperone present.   Constitutional:       General: She is not in " acute distress.     Appearance: Normal appearance. She is not ill-appearing, toxic-appearing or diaphoretic.   HENT:      Head: Normocephalic and atraumatic.      Nose: Nose normal.   Cardiovascular:      Rate and Rhythm: Normal rate and regular rhythm.   Pulmonary:      Effort: Pulmonary effort is normal. No respiratory distress.   Musculoskeletal:      Cervical back: Normal range of motion and neck supple.      Comments: Tenderness right low back/buttock.  No overlying skin changes are performed.  No crepitus to palpation.  Pain does not radiate.  Full range of motion of back with flexion/extension/rotation bilaterally and lateral flexion bilaterally.  Bilateral patellar tendon reflexes intact and symmetric.  Bilateral posterior tibial pulses intact and symmetrical.  No midline tenderness to the C-spine, T-spine, L-spine.  Patient is ambulatory.   Skin:     General: Skin is warm and dry.   Neurological:      General: No focal deficit present.      Mental Status: She is alert and oriented to person, place, and time.      Motor: No weakness.      Gait: Gait normal.      Deep Tendon Reflexes: Reflexes normal.   Psychiatric:         Mood and Affect: Mood normal.         Behavior: Behavior normal.         Procedures    Point of Care Test & Imaging Results from this visit  No results found for this visit on 03/04/25.   No results found.    Diagnostic study results (if any) were reviewed by Mukesh Palafox PA-C.    Assessment/Plan   Allergies, medications, history, and pertinent labs/EKGs/Imaging reviewed by Mukesh Palafox PA-C.     Medical Decision Making  49-year-old female presents urgent care for complaint of back pain right side worsening over the past 3 to 4 days.  States she has chronic back pain feels like previous flares.  States she does see pain management.  Denies F/C/N/V/sweats/Cp/SOB/ab pain, trauma, unintentional weight loss/hx of cancer, saddle anesthesia, bowel/bladder incontinence/retention, hx  of IV drug use, alcohol abuse, DM.  Gave Toradol and Decadron IM in the urgent care today.  Prescribed prednisone and lidocaine patches.  Patient states muscle relaxants do not help.  Educated on supportive care.  Follow-up PCP in 5 to 7 days for recheck and to recheck blood pressure.  Return/ER precautions.  Patient agrees with plan.    Orders and Diagnoses  There are no diagnoses linked to this encounter.    Medical Admin Record      Patient disposition: Home    Electronically signed by Mukesh Palafox PA-C  4:01 PM

## 2025-03-04 NOTE — PATIENT INSTRUCTIONS
Take medications as prescribed.  If symptoms worsen, do not improve, or any other concerning or worrisome symptoms develop please return to the clinic or go to the emergency department.  Follow-up with PCP in 5 to 7 days for recheck of symptoms and blood pressure.

## 2025-03-07 ENCOUNTER — OFFICE VISIT (OUTPATIENT)
Dept: PRIMARY CARE | Facility: CLINIC | Age: 50
End: 2025-03-07

## 2025-03-07 VITALS
WEIGHT: 195.8 LBS | DIASTOLIC BLOOD PRESSURE: 72 MMHG | SYSTOLIC BLOOD PRESSURE: 136 MMHG | HEART RATE: 72 BPM | BODY MASS INDEX: 29.77 KG/M2

## 2025-03-07 DIAGNOSIS — M54.50 CHRONIC LOW BACK PAIN, UNSPECIFIED BACK PAIN LATERALITY, UNSPECIFIED WHETHER SCIATICA PRESENT: Primary | ICD-10-CM

## 2025-03-07 DIAGNOSIS — G89.29 CHRONIC LOW BACK PAIN, UNSPECIFIED BACK PAIN LATERALITY, UNSPECIFIED WHETHER SCIATICA PRESENT: Primary | ICD-10-CM

## 2025-03-07 DIAGNOSIS — R09.81 NASAL CONGESTION: ICD-10-CM

## 2025-03-07 PROCEDURE — 97810 ACUP 1/> WO ESTIM 1ST 15 MIN: CPT | Performed by: FAMILY MEDICINE

## 2025-03-07 PROCEDURE — 1036F TOBACCO NON-USER: CPT | Performed by: FAMILY MEDICINE

## 2025-03-07 PROCEDURE — 97811 ACUP 1/> W/O ESTIM EA ADD 15: CPT | Performed by: FAMILY MEDICINE

## 2025-03-07 ASSESSMENT — COLUMBIA-SUICIDE SEVERITY RATING SCALE - C-SSRS
6. HAVE YOU EVER DONE ANYTHING, STARTED TO DO ANYTHING, OR PREPARED TO DO ANYTHING TO END YOUR LIFE?: NO
1. IN THE PAST MONTH, HAVE YOU WISHED YOU WERE DEAD OR WISHED YOU COULD GO TO SLEEP AND NOT WAKE UP?: NO
2. HAVE YOU ACTUALLY HAD ANY THOUGHTS OF KILLING YOURSELF?: NO

## 2025-03-07 ASSESSMENT — PAIN SCALES - GENERAL: PAINLEVEL_OUTOF10: 5

## 2025-03-07 NOTE — PATIENT INSTRUCTIONS
You can add green dragon, 3 twice a day, and imperial qi, 3 twice a day.  Those help manage distress, and support your energy foundation.  This is where your problem lies.  You can get those at [facility].  As we discussed, thoughts of the need to take forever but for 4 to 6 weeks at least and then see how you do.  You can continue acupuncture once a week as well.  Continue to do the energy raymond, especially the last 2.    Add Flonase or Nasacort, 2 sprays each nostril, once a day. Let me know if not improving.   You can also use Cold Prevention Tea, twice a day for now and then once a day until it's warm again.     We do have a tea called Night Night tea, which can help with sleep.

## 2025-03-07 NOTE — PROGRESS NOTES
sSubjective   Patient ID: Kristen Becker is a 49 y.o. female who presents for acupuncture.    HPI   She presents today for her acupuncture treatment.  She is having pain in the back.  She has mid to being tired frequently.  She does not sleep much and then does not sleep well.  Sometimes he is working 72 hours at work.  People at work are draining her, she states.  It is hard to be there.    She also continues to have nasal congestion.  She used the steroids and the antibiotics and just still is not quite clear.      Review of Systems    Objective   /72   Pulse 72   Wt 88.8 kg (195 lb 12.8 oz)   BMI 29.77 kg/m²     Physical Exam  She is alert, parishes, her affect is pleasant.  Nasal mucosa is swollen but not red.  No cervical lymphadenopathy.  Oromucosa is normal.  Does have some tenderness in the paraspinals of the lower back.    Assessment/Plan   Diagnoses and all orders for this visit:  Chronic low back pain, unspecified back pain laterality, unspecified whether sciatica present  Nasal congestion  She certainly is energy depleted, likely related to the job.  Not all the hours she is working but the people there.  I recommend green dragon SweetSpot WiFiial Chi, 3 of each twice a day.  For the nasal congestion she can certainly use cold prevention tea twice a day until it is resolved then once a day until it warms up again.  She can certainly start Flonase or Nasacort 2 sprays each nostril once a day and see if that does not resolve it.  She let me know.  As far as sleep goes, if she can get more sleep she will sleep better.  Hopefully with the herbs and help her process a lot of is going on she will sleep better.

## 2025-05-27 ENCOUNTER — APPOINTMENT (OUTPATIENT)
Dept: RADIOLOGY | Facility: HOSPITAL | Age: 50
End: 2025-05-27
Payer: MEDICARE

## 2025-05-27 ENCOUNTER — HOSPITAL ENCOUNTER (EMERGENCY)
Facility: HOSPITAL | Age: 50
Discharge: HOME | End: 2025-05-27
Payer: MEDICARE

## 2025-05-27 ENCOUNTER — APPOINTMENT (OUTPATIENT)
Dept: CARDIOLOGY | Facility: HOSPITAL | Age: 50
End: 2025-05-27
Payer: MEDICARE

## 2025-05-27 VITALS
HEIGHT: 68 IN | TEMPERATURE: 99.3 F | BODY MASS INDEX: 29.4 KG/M2 | OXYGEN SATURATION: 99 % | DIASTOLIC BLOOD PRESSURE: 90 MMHG | RESPIRATION RATE: 17 BRPM | WEIGHT: 194 LBS | HEART RATE: 90 BPM | SYSTOLIC BLOOD PRESSURE: 145 MMHG

## 2025-05-27 DIAGNOSIS — I10 HYPERTENSION, UNSPECIFIED TYPE: ICD-10-CM

## 2025-05-27 DIAGNOSIS — S09.90XA CLOSED HEAD INJURY, INITIAL ENCOUNTER: ICD-10-CM

## 2025-05-27 DIAGNOSIS — R91.8 PULMONARY NODULES: ICD-10-CM

## 2025-05-27 DIAGNOSIS — M25.531 RIGHT WRIST PAIN: ICD-10-CM

## 2025-05-27 DIAGNOSIS — R07.9 CHEST PAIN, UNSPECIFIED TYPE: ICD-10-CM

## 2025-05-27 DIAGNOSIS — S16.1XXA CERVICAL STRAIN, ACUTE, INITIAL ENCOUNTER: ICD-10-CM
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
## 2025-05-27 LAB
ALBUMIN SERPL BCP-MCNC: 4.3 G/DL (ref 3.4–5)
ALP SERPL-CCNC: 55 U/L (ref 33–110)
ALT SERPL W P-5'-P-CCNC: 18 U/L (ref 7–45)
ANION GAP SERPL CALCULATED.3IONS-SCNC: 13 MMOL/L (ref 10–20)
AST SERPL W P-5'-P-CCNC: 16 U/L (ref 9–39)
BASOPHILS # BLD AUTO: 0.05 X10*3/UL (ref 0–0.1)
BASOPHILS NFR BLD AUTO: 0.4 %
BILIRUB SERPL-MCNC: 0.4 MG/DL (ref 0–1.2)
BUN SERPL-MCNC: 13 MG/DL (ref 6–23)
CALCIUM SERPL-MCNC: 9.5 MG/DL (ref 8.6–10.3)
CARDIAC TROPONIN I PNL SERPL HS: 3 NG/L (ref 0–13)
CHLORIDE SERPL-SCNC: 105 MMOL/L (ref 98–107)
CO2 SERPL-SCNC: 25 MMOL/L (ref 21–32)
CREAT SERPL-MCNC: 0.75 MG/DL (ref 0.5–1.05)
EGFRCR SERPLBLD CKD-EPI 2021: >90 ML/MIN/1.73M*2
EOSINOPHIL # BLD AUTO: 0.07 X10*3/UL (ref 0–0.7)
EOSINOPHIL NFR BLD AUTO: 0.6 %
ERYTHROCYTE [DISTWIDTH] IN BLOOD BY AUTOMATED COUNT: 12.2 % (ref 11.5–14.5)
GLUCOSE SERPL-MCNC: 102 MG/DL (ref 74–99)
HCT VFR BLD AUTO: 41.8 % (ref 36–46)
HGB BLD-MCNC: 13.7 G/DL (ref 12–16)
IMM GRANULOCYTES # BLD AUTO: 0.26 X10*3/UL (ref 0–0.7)
IMM GRANULOCYTES NFR BLD AUTO: 2.3 % (ref 0–0.9)
LYMPHOCYTES # BLD AUTO: 1.49 X10*3/UL (ref 1.2–4.8)
LYMPHOCYTES NFR BLD AUTO: 13.2 %
MCH RBC QN AUTO: 29.3 PG (ref 26–34)
MCHC RBC AUTO-ENTMCNC: 32.8 G/DL (ref 32–36)
MCV RBC AUTO: 90 FL (ref 80–100)
MONOCYTES # BLD AUTO: 0.84 X10*3/UL (ref 0.1–1)
MONOCYTES NFR BLD AUTO: 7.4 %
NEUTROPHILS # BLD AUTO: 8.6 X10*3/UL (ref 1.2–7.7)
NEUTROPHILS NFR BLD AUTO: 76.1 %
NRBC BLD-RTO: 0 /100 WBCS (ref 0–0)
PLATELET # BLD AUTO: 282 X10*3/UL (ref 150–450)
POTASSIUM SERPL-SCNC: 4.2 MMOL/L (ref 3.5–5.3)
PROT SERPL-MCNC: 6.9 G/DL (ref 6.4–8.2)
RBC # BLD AUTO: 4.67 X10*6/UL (ref 4–5.2)
SODIUM SERPL-SCNC: 139 MMOL/L (ref 136–145)
WBC # BLD AUTO: 11.3 X10*3/UL (ref 4.4–11.3)

## 2025-05-27 PROCEDURE — 73110 X-RAY EXAM OF WRIST: CPT | Mod: RIGHT SIDE | Performed by: RADIOLOGY

## 2025-05-27 PROCEDURE — 71260 CT THORAX DX C+: CPT | Mod: FOREIGN READ | Performed by: RADIOLOGY

## 2025-05-27 PROCEDURE — 36415 COLL VENOUS BLD VENIPUNCTURE: CPT | Performed by: PHYSICIAN ASSISTANT

## 2025-05-27 PROCEDURE — 2550000001 HC RX 255 CONTRASTS: Performed by: PHYSICIAN ASSISTANT

## 2025-05-27 PROCEDURE — 80053 COMPREHEN METABOLIC PANEL: CPT | Performed by: PHYSICIAN ASSISTANT

## 2025-05-27 PROCEDURE — 96372 THER/PROPH/DIAG INJ SC/IM: CPT | Performed by: PHYSICIAN ASSISTANT

## 2025-05-27 PROCEDURE — 74177 CT ABD & PELVIS W/CONTRAST: CPT

## 2025-05-27 PROCEDURE — 70450 CT HEAD/BRAIN W/O DYE: CPT | Performed by: RADIOLOGY

## 2025-05-27 PROCEDURE — 74177 CT ABD & PELVIS W/CONTRAST: CPT | Mod: FOREIGN READ | Performed by: RADIOLOGY

## 2025-05-27 PROCEDURE — 72125 CT NECK SPINE W/O DYE: CPT | Performed by: RADIOLOGY

## 2025-05-27 PROCEDURE — 73110 X-RAY EXAM OF WRIST: CPT | Mod: RT

## 2025-05-27 PROCEDURE — 84484 ASSAY OF TROPONIN QUANT: CPT | Performed by: PHYSICIAN ASSISTANT

## 2025-05-27 PROCEDURE — 99285 EMERGENCY DEPT VISIT HI MDM: CPT | Mod: 25

## 2025-05-27 PROCEDURE — 2500000004 HC RX 250 GENERAL PHARMACY W/ HCPCS (ALT 636 FOR OP/ED): Mod: JZ | Performed by: PHYSICIAN ASSISTANT

## 2025-05-27 PROCEDURE — 70450 CT HEAD/BRAIN W/O DYE: CPT

## 2025-05-27 PROCEDURE — 71046 X-RAY EXAM CHEST 2 VIEWS: CPT

## 2025-05-27 PROCEDURE — 85025 COMPLETE CBC W/AUTO DIFF WBC: CPT | Performed by: PHYSICIAN ASSISTANT

## 2025-05-27 PROCEDURE — 71046 X-RAY EXAM CHEST 2 VIEWS: CPT | Mod: FOREIGN READ | Performed by: RADIOLOGY

## 2025-05-27 PROCEDURE — 72125 CT NECK SPINE W/O DYE: CPT

## 2025-05-27 PROCEDURE — 96360 HYDRATION IV INFUSION INIT: CPT | Mod: 59

## 2025-05-27 PROCEDURE — 93005 ELECTROCARDIOGRAM TRACING: CPT

## 2025-05-27 PROCEDURE — 96361 HYDRATE IV INFUSION ADD-ON: CPT

## 2025-05-27 RX ORDER — IBUPROFEN 600 MG/1
600 TABLET, FILM COATED ORAL EVERY 6 HOURS PRN
Qty: 15 TABLET | Refills: 0 | Status: SHIPPED | OUTPATIENT
Start: 2025-05-27 | End: 2025-06-03

## 2025-05-27 RX ORDER — ORPHENADRINE CITRATE 30 MG/ML
60 INJECTION INTRAMUSCULAR; INTRAVENOUS ONCE
Status: COMPLETED | OUTPATIENT
Start: 2025-05-27 | End: 2025-05-27

## 2025-05-27 RX ORDER — LIDOCAINE 50 MG/G
1 PATCH TOPICAL DAILY
Qty: 7 PATCH | Refills: 0 | Status: SHIPPED | OUTPATIENT
Start: 2025-05-27

## 2025-05-27 RX ORDER — CYCLOBENZAPRINE HCL 10 MG
10 TABLET ORAL 2 TIMES DAILY PRN
Qty: 12 TABLET | Refills: 0 | Status: SHIPPED | OUTPATIENT
Start: 2025-05-27 | End: 2025-06-06

## 2025-05-27 RX ADMIN — IOHEXOL 75 ML: 350 INJECTION, SOLUTION INTRAVENOUS at 14:52

## 2025-05-27 RX ADMIN — ORPHENADRINE CITRATE 60 MG: 30 INJECTION, SOLUTION INTRAMUSCULAR; INTRAVENOUS at 12:30

## 2025-05-27 RX ADMIN — SODIUM CHLORIDE 1000 ML: 900 INJECTION, SOLUTION INTRAVENOUS at 12:30

## 2025-05-27 ASSESSMENT — COLUMBIA-SUICIDE SEVERITY RATING SCALE - C-SSRS
1. IN THE PAST MONTH, HAVE YOU WISHED YOU WERE DEAD OR WISHED YOU COULD GO TO SLEEP AND NOT WAKE UP?: NO
6. HAVE YOU EVER DONE ANYTHING, STARTED TO DO ANYTHING, OR PREPARED TO DO ANYTHING TO END YOUR LIFE?: NO
2. HAVE YOU ACTUALLY HAD ANY THOUGHTS OF KILLING YOURSELF?: NO

## 2025-05-27 ASSESSMENT — PAIN - FUNCTIONAL ASSESSMENT: PAIN_FUNCTIONAL_ASSESSMENT: 0-10

## 2025-05-27 ASSESSMENT — PAIN SCALES - GENERAL
PAINLEVEL_OUTOF10: 0 - NO PAIN
PAINLEVEL_OUTOF10: 5 - MODERATE PAIN
PAINLEVEL_OUTOF10: 7

## 2025-05-27 NOTE — DISCHARGE INSTRUCTIONS
"Return immediately to the emergency department should you develop new different worsening pains or symptoms, you were found to have some pulmonary nodules, these must be further evaluated and monitored, please talk to your family doctor about this.    Thank you for allowing us to take care of you today. While you are home, you might receive a survey about your care in our hospital. Your nurse and myself, Berry Zhong PA-C, would love your honest feedback on how we can improve your care. Your feedback and especially your positive comments help our hospital receive the support we need to continue to serve you and your family. Thank you again for trusting us with your care.\"    Be sure to follow up as directed in 1-2 days.  All of the details of your follow up instructions are detailed in the follow up section of this packet.     If you are being discharged with any pains medications or muscle relaxers (norco, Vicodin, hydrocodone products, Percocet, oxycodone products, flexeril, cyclobenzaprine, robaxin, norflex, brand or generic, or any other pain controlling medications with the exception of Ibuprofen and regular Tylenol, do not drive or operate machinery, climb ladders or participate in any activity that could potentially put yourself or others at risk should you get dizzy, or be/feel impaired at all.        It is important to remember that your care does not end here and you must continue to monitor your condition closely. Please return to the emergency department for any worsening or concerning signs or symptoms as directed by our conversations and the discharge instructions. Otherwise please follow up with your doctor in 2 days if no better or worse. If you do not have a doctor please contact the referral number on your discharge instructions. Please contact any physician specialists provided in your discharge notes as it is very important to follow up with them regarding your condition. If you are unable " to reach the physicians provided, please come back to the Emergency Department at any time.        Return to emergency room without delay for ANY new or worsening pains or for any other symptoms or concerns.
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
## 2025-05-28 ENCOUNTER — TELEPHONE (OUTPATIENT)
Dept: HEMATOLOGY/ONCOLOGY | Facility: CLINIC | Age: 50
End: 2025-05-28
Payer: COMMERCIAL

## 2025-05-28 NOTE — TELEPHONE ENCOUNTER
Patient returned my call. I explained Diagnostic Clinic referral for lung nodules. She denied history of smoking or family history of lung cancer. She states she has known about these nodules for years and has been in a monitoring program for them. At this time, she only requires imaging every 5 years because they have been stable for so long. I explained that our team was not needed then and we would cancel the referral. She is in agreement with plan, no further needs at this time.

## 2025-05-28 NOTE — TELEPHONE ENCOUNTER
Referral placed to [facility] by KARYNA Fenton, [facility] [place] [facility], for few scattered LLL pulmonary nodules measuring up to 4mm, discovered incidentally on CT chest imaging yesterday, 5/27/25. Per guidelines, no additional follow-up needed, unless Hx of smoking or family Hx of lung cancer. If risk factors such as these, recommend follow-up w/ her established PCP for repeat imaging in 12 months to ensure stability.  LAUREN Montoya.CNP  [facility]

## 2025-05-28 NOTE — TELEPHONE ENCOUNTER
Called patient, no answer. Left VM on secure VM stating that Diagnostic Clinic referral had been made by ER team and asked her for call back to discuss further. Diagnostic Clinic phone number given.

## 2025-06-01 LAB
ATRIAL RATE: 82 BPM
P AXIS: 77 DEGREES
P OFFSET: 208 MS
P ONSET: 149 MS
PR INTERVAL: 144 MS
Q ONSET: 221 MS
QRS COUNT: 14 BEATS
QRS DURATION: 76 MS
QT INTERVAL: 360 MS
QTC CALCULATION(BAZETT): 420 MS
QTC FREDERICIA: 399 MS
R AXIS: 81 DEGREES
T AXIS: 63 DEGREES
T OFFSET: 401 MS
VENTRICULAR RATE: 82 BPM

## 2025-06-30 ENCOUNTER — OFFICE VISIT (OUTPATIENT)
Dept: URGENT CARE | Age: 50
End: 2025-06-30
Payer: COMMERCIAL

## 2025-06-30 ENCOUNTER — HOSPITAL ENCOUNTER (EMERGENCY)
Facility: HOSPITAL | Age: 50
Discharge: HOME | End: 2025-06-30
Attending: EMERGENCY MEDICINE
Payer: COMMERCIAL

## 2025-06-30 ENCOUNTER — APPOINTMENT (OUTPATIENT)
Dept: RADIOLOGY | Facility: HOSPITAL | Age: 50
End: 2025-06-30
Payer: COMMERCIAL

## 2025-06-30 VITALS
HEIGHT: 68 IN | OXYGEN SATURATION: 100 % | TEMPERATURE: 97.4 F | DIASTOLIC BLOOD PRESSURE: 90 MMHG | WEIGHT: 195 LBS | RESPIRATION RATE: 18 BRPM | SYSTOLIC BLOOD PRESSURE: 139 MMHG | BODY MASS INDEX: 29.55 KG/M2 | HEART RATE: 72 BPM

## 2025-06-30 VITALS
TEMPERATURE: 98.6 F | DIASTOLIC BLOOD PRESSURE: 82 MMHG | SYSTOLIC BLOOD PRESSURE: 146 MMHG | WEIGHT: 194 LBS | BODY MASS INDEX: 29.5 KG/M2 | RESPIRATION RATE: 18 BRPM | HEART RATE: 78 BPM | OXYGEN SATURATION: 99 %

## 2025-06-30 DIAGNOSIS — N20.1 CALCULUS OF DISTAL LEFT URETER: Primary | ICD-10-CM

## 2025-06-30 DIAGNOSIS — N23 RENAL COLIC ON LEFT SIDE: ICD-10-CM

## 2025-06-30 DIAGNOSIS — R10.9 ABDOMINAL PAIN, UNSPECIFIED ABDOMINAL LOCATION: ICD-10-CM

## 2025-06-30 DIAGNOSIS — R11.2 NAUSEA AND VOMITING, UNSPECIFIED VOMITING TYPE: ICD-10-CM

## 2025-06-30 DIAGNOSIS — R10.32 LEFT LOWER QUADRANT ABDOMINAL PAIN: Primary | ICD-10-CM

## 2025-06-30 LAB
ALBUMIN SERPL BCP-MCNC: 4.5 G/DL (ref 3.4–5)
ALP SERPL-CCNC: 71 U/L (ref 33–110)
ALT SERPL W P-5'-P-CCNC: 18 U/L (ref 7–45)
ANION GAP SERPL CALC-SCNC: 14 MMOL/L (ref 10–20)
APPEARANCE UR: CLEAR
AST SERPL W P-5'-P-CCNC: 22 U/L (ref 9–39)
BASOPHILS # BLD AUTO: 0.06 X10*3/UL (ref 0–0.1)
BASOPHILS NFR BLD AUTO: 0.6 %
BILIRUB SERPL-MCNC: 0.5 MG/DL (ref 0–1.2)
BILIRUB UR STRIP.AUTO-MCNC: NEGATIVE MG/DL
BUN SERPL-MCNC: 13 MG/DL (ref 6–23)
CALCIUM SERPL-MCNC: 9.4 MG/DL (ref 8.6–10.3)
CHLORIDE SERPL-SCNC: 102 MMOL/L (ref 98–107)
CO2 SERPL-SCNC: 25 MMOL/L (ref 21–32)
COLOR UR: ABNORMAL
CREAT SERPL-MCNC: 0.84 MG/DL (ref 0.5–1.05)
EGFRCR SERPLBLD CKD-EPI 2021: 86 ML/MIN/1.73M*2
EOSINOPHIL # BLD AUTO: 0.11 X10*3/UL (ref 0–0.7)
EOSINOPHIL NFR BLD AUTO: 1.1 %
ERYTHROCYTE [DISTWIDTH] IN BLOOD BY AUTOMATED COUNT: 12.2 % (ref 11.5–14.5)
GLUCOSE SERPL-MCNC: 102 MG/DL (ref 74–99)
GLUCOSE UR STRIP.AUTO-MCNC: NORMAL MG/DL
HCG UR QL IA.RAPID: NEGATIVE
HCT VFR BLD AUTO: 38.6 % (ref 36–46)
HGB BLD-MCNC: 12.9 G/DL (ref 12–16)
HYALINE CASTS #/AREA URNS AUTO: ABNORMAL /LPF
IMM GRANULOCYTES # BLD AUTO: 0.06 X10*3/UL (ref 0–0.7)
IMM GRANULOCYTES NFR BLD AUTO: 0.6 % (ref 0–0.9)
KETONES UR STRIP.AUTO-MCNC: NEGATIVE MG/DL
LEUKOCYTE ESTERASE UR QL STRIP.AUTO: NEGATIVE
LIPASE SERPL-CCNC: 25 U/L (ref 9–82)
LYMPHOCYTES # BLD AUTO: 1.79 X10*3/UL (ref 1.2–4.8)
LYMPHOCYTES NFR BLD AUTO: 17.6 %
MCH RBC QN AUTO: 30.4 PG (ref 26–34)
MCHC RBC AUTO-ENTMCNC: 33.4 G/DL (ref 32–36)
MCV RBC AUTO: 91 FL (ref 80–100)
MONOCYTES # BLD AUTO: 0.59 X10*3/UL (ref 0.1–1)
MONOCYTES NFR BLD AUTO: 5.8 %
MUCOUS THREADS #/AREA URNS AUTO: ABNORMAL /LPF
NEUTROPHILS # BLD AUTO: 7.54 X10*3/UL (ref 1.2–7.7)
NEUTROPHILS NFR BLD AUTO: 74.3 %
NITRITE UR QL STRIP.AUTO: NEGATIVE
NRBC BLD-RTO: 0 /100 WBCS (ref 0–0)
PH UR STRIP.AUTO: 6 [PH]
PLATELET # BLD AUTO: 308 X10*3/UL (ref 150–450)
POTASSIUM SERPL-SCNC: 3.6 MMOL/L (ref 3.5–5.3)
PROT SERPL-MCNC: 7.3 G/DL (ref 6.4–8.2)
PROT UR STRIP.AUTO-MCNC: NEGATIVE MG/DL
RBC # BLD AUTO: 4.25 X10*6/UL (ref 4–5.2)
RBC # UR STRIP.AUTO: ABNORMAL MG/DL
RBC #/AREA URNS AUTO: ABNORMAL /HPF
SODIUM SERPL-SCNC: 137 MMOL/L (ref 136–145)
SP GR UR STRIP.AUTO: 1.01
SQUAMOUS #/AREA URNS AUTO: ABNORMAL /HPF
UROBILINOGEN UR STRIP.AUTO-MCNC: NORMAL MG/DL
WBC # BLD AUTO: 10.2 X10*3/UL (ref 4.4–11.3)
WBC #/AREA URNS AUTO: ABNORMAL /HPF
YEAST BUDDING #/AREA UR COMP ASSIST: PRESENT /HPF

## 2025-06-30 PROCEDURE — 81025 URINE PREGNANCY TEST: CPT | Performed by: EMERGENCY MEDICINE

## 2025-06-30 PROCEDURE — 83690 ASSAY OF LIPASE: CPT | Performed by: EMERGENCY MEDICINE

## 2025-06-30 PROCEDURE — 99284 EMERGENCY DEPT VISIT MOD MDM: CPT | Mod: 25 | Performed by: EMERGENCY MEDICINE

## 2025-06-30 PROCEDURE — 74176 CT ABD & PELVIS W/O CONTRAST: CPT

## 2025-06-30 PROCEDURE — 74176 CT ABD & PELVIS W/O CONTRAST: CPT | Performed by: RADIOLOGY

## 2025-06-30 PROCEDURE — 81001 URINALYSIS AUTO W/SCOPE: CPT | Performed by: EMERGENCY MEDICINE

## 2025-06-30 PROCEDURE — 84075 ASSAY ALKALINE PHOSPHATASE: CPT | Performed by: EMERGENCY MEDICINE

## 2025-06-30 PROCEDURE — 36415 COLL VENOUS BLD VENIPUNCTURE: CPT | Performed by: EMERGENCY MEDICINE

## 2025-06-30 PROCEDURE — 85025 COMPLETE CBC W/AUTO DIFF WBC: CPT | Performed by: EMERGENCY MEDICINE

## 2025-06-30 RX ORDER — TAMSULOSIN HYDROCHLORIDE 0.4 MG/1
0.4 CAPSULE ORAL DAILY
Qty: 20 CAPSULE | Refills: 0 | Status: SHIPPED | OUTPATIENT
Start: 2025-06-30 | End: 2025-07-20

## 2025-06-30 RX ORDER — KETOROLAC TROMETHAMINE 10 MG/1
10 TABLET, FILM COATED ORAL EVERY 6 HOURS PRN
Qty: 20 TABLET | Refills: 0 | Status: SHIPPED | OUTPATIENT
Start: 2025-06-30 | End: 2025-07-05

## 2025-06-30 RX ORDER — KETOROLAC TROMETHAMINE 30 MG/ML
30 INJECTION, SOLUTION INTRAMUSCULAR; INTRAVENOUS ONCE
Status: COMPLETED | OUTPATIENT
Start: 2025-06-30 | End: 2025-06-30

## 2025-06-30 RX ORDER — ONDANSETRON 4 MG/1
4 TABLET, ORALLY DISINTEGRATING ORAL ONCE
Status: COMPLETED | OUTPATIENT
Start: 2025-06-30 | End: 2025-06-30

## 2025-06-30 RX ORDER — ONDANSETRON 4 MG/1
4 TABLET, ORALLY DISINTEGRATING ORAL EVERY 8 HOURS PRN
Qty: 15 TABLET | Refills: 0 | Status: SHIPPED | OUTPATIENT
Start: 2025-06-30

## 2025-06-30 RX ADMIN — ONDANSETRON 4 MG: 4 TABLET, ORALLY DISINTEGRATING ORAL at 16:44

## 2025-06-30 RX ADMIN — KETOROLAC TROMETHAMINE 30 MG: 30 INJECTION, SOLUTION INTRAMUSCULAR; INTRAVENOUS at 16:44

## 2025-06-30 ASSESSMENT — PAIN DESCRIPTION - ORIENTATION: ORIENTATION: LEFT

## 2025-06-30 ASSESSMENT — PAIN DESCRIPTION - DESCRIPTORS: DESCRIPTORS: ACHING

## 2025-06-30 ASSESSMENT — PAIN SCALES - GENERAL: PAINLEVEL_OUTOF10: 5 - MODERATE PAIN

## 2025-06-30 ASSESSMENT — PAIN DESCRIPTION - PAIN TYPE: TYPE: ACUTE PAIN

## 2025-06-30 ASSESSMENT — ENCOUNTER SYMPTOMS
DEPRESSION: 0
OCCASIONAL FEELINGS OF UNSTEADINESS: 0
LOSS OF SENSATION IN FEET: 0

## 2025-06-30 ASSESSMENT — PAIN DESCRIPTION - LOCATION: LOCATION: ABDOMEN

## 2025-06-30 ASSESSMENT — PAIN - FUNCTIONAL ASSESSMENT: PAIN_FUNCTIONAL_ASSESSMENT: 0-10

## 2025-06-30 NOTE — PROGRESS NOTES
Subjective   Patient ID: Kristen Becker is a 50 y.o. female. They present today with a chief complaint of Vomiting, Abdominal Pain (10 plus  pain , left lower abdominal pain that radiates into left lower back ), and Nausea.    History of Present Illness  50-year-old female presents urgent care with left lower quadrant abdominal pain states 10 out of 10 radiates to left lower back as well as nausea and vomiting without blood started yesterday but again today several hours ago.  Denies current fevers, chest pain or shortness of breath, focal deficits, rashes.  States she has urinary urgency but is unable to urinate and was unable to produce a urine here at the urgent care.  Denies abdominal surgical history.  Denies history of diverticulitis.  Gave 30 mg Toradol IM and 4 mg ODT Zofran in urgent care today.  Patient was taken to the emergency department via ambulance.  Notified Dr. Bah.          Past Medical History  Allergies as of 06/30/2025    (No Known Allergies)       Prescriptions Prior to Admission[1]     Medical History[2]    Surgical History[3]     reports that she has never smoked. She has never used smokeless tobacco. She reports that she does not drink alcohol and does not use drugs.    Review of Systems  Review of Systems   All other systems reviewed and are negative.                                 Objective    Vitals:    06/30/25 1619   BP: 146/82   Pulse: 78   Resp: 18   Temp: 37 °C (98.6 °F)   TempSrc: Oral   SpO2: 99%   Weight: 88 kg (194 lb)     No LMP recorded. Patient has had an injection.    Physical Exam  Vitals reviewed.   Constitutional:       General: She is not in acute distress.     Appearance: Normal appearance. She is not ill-appearing, toxic-appearing or diaphoretic.   HENT:      Head: Normocephalic and atraumatic.      Nose: Nose normal.      Mouth/Throat:      Mouth: Mucous membranes are moist.      Pharynx: Oropharynx is clear.      Comments: Airway clear.  Cardiovascular:       Rate and Rhythm: Normal rate and regular rhythm.   Pulmonary:      Effort: Pulmonary effort is normal. No respiratory distress.      Breath sounds: Normal breath sounds. No stridor. No wheezing, rhonchi or rales.   Abdominal:      General: Abdomen is flat.      Palpations: Abdomen is soft.      Tenderness: There is abdominal tenderness (Left lower abdominal quadrant tenderness). There is no right CVA tenderness or left CVA tenderness.   Musculoskeletal:      Cervical back: Normal range of motion and neck supple. No rigidity or tenderness.   Lymphadenopathy:      Cervical: No cervical adenopathy.   Skin:     General: Skin is warm and dry.   Neurological:      General: No focal deficit present.      Mental Status: She is alert and oriented to person, place, and time.   Psychiatric:         Mood and Affect: Mood normal.         Behavior: Behavior normal.         Procedures    Point of Care Test & Imaging Results from this visit  No results found for this visit on 06/30/25.   Imaging  No results found.    Cardiology, Vascular, and Other Imaging  No other imaging results found for the past 2 days      Diagnostic study results (if any) were reviewed by Mukesh Palafox PA-C.    Assessment/Plan   Allergies, medications, history, and pertinent labs/EKGs/Imaging reviewed by Mukesh Palafox PA-C.     Medical Decision Making  50-year-old female presents urgent care with left lower quadrant abdominal pain states 10 out of 10 radiates to left lower back as well as nausea and vomiting without blood started yesterday but again today several hours ago.  Denies current fevers, chest pain or shortness of breath, focal deficits, rashes.  States she has urinary urgency but is unable to urinate and was unable to produce a urine here at the urgent care.  Denies abdominal surgical history.  Denies history of diverticulitis.  Gave 30 mg Toradol IM and 4 mg ODT Zofran in urgent care today.  Patient was taken to the emergency  department via ambulance.  Notified Dr. Bah.    Orders and Diagnoses  Diagnoses and all orders for this visit:  Abdominal pain, unspecified abdominal location  -     POCT pregnancy, urine manually resulted  -     POCT UA Automated manually resulted      Medical Admin Record      Patient disposition: ED    Electronically signed by Mukesh Palafox PA-C  4:30 PM           [1] (Not in a hospital admission)  [2]   Past Medical History:  Diagnosis Date    Carpal tunnel syndrome, bilateral     Lumbar herniated disc    [3]   Past Surgical History:  Procedure Laterality Date    CARPAL TUNNEL RELEASE

## 2025-06-30 NOTE — ED PROVIDER NOTES
Department of Emergency Medicine   ED  Provider Note  Admit Date/RoomTime: 6/30/2025  5:10 PM  ED Room: [facility]                  History of Present Illness:   Kristen Becker is a 50 y.o. female presenting to the ED for left lower quadrant abdominal pain and left flank pain, beginning last night.  Patient reports nausea and vomiting around 3 PM.  She went to urgent care and received Toradol and Zofran and was referred to the emergency room.  The complaint has been persistent, moderate in severity, and worsened by nothing.  Patient reports acute onset of some left lower quadrant left flank pain that started around 1030 last night.  She had nausea and vomiting x 1.  Seem to improve by about 1230 or so she was able to sleep last night she got up this morning felt okay went to work started having some pain around 230 then acutely worse again around 3 PM.  Again the pain was in the left lower quadrant seems to radiate around and up to the left flank area.  She had feelings like she needed to urinate but only going small amounts.  No unusual vaginal discharge.  She denies chance of pregnancy she says she is on the Depo shot.  She has had no unusual vaginal discharge.  No fever or chills.  Nothing seems to make the pain better or worse.  She went to urgent care where they did give her shot of Toradol and gave her some Zofran orally.  She is feeling better from nausea vomiting and pain standpoint at this time.      Review of Systems:   Pertinent positives and review of systems as noted above.  Remaining 10 review of systems is negative or noncontributory to today's episode of care.        --------------------------------------------- PAST HISTORY ---------------------------------------------  Past Medical History:  has a past medical history of Carpal tunnel syndrome, bilateral and Lumbar herniated disc.    She has no past medical history of Spondylolisthesis.    Past Surgical History:  has a past surgical history that  includes Carpal tunnel release.    Social History:  reports that she has never smoked. She has never used smokeless tobacco. She reports that she does not drink alcohol and does not use drugs.    Family History: family history includes Colon cancer in her mother; Lymphoma in her father. Unless otherwise noted, family history is non contributory    Patient's Medications   New Prescriptions    KETOROLAC (TORADOL) 10 MG TABLET    Take 1 tablet (10 mg) by mouth every 6 hours if needed for moderate pain (4 - 6) for up to 5 days.    ONDANSETRON ODT (ZOFRAN-ODT) 4 MG DISINTEGRATING TABLET    Dissolve 1 tablet (4 mg) in the mouth every 8 hours if needed for nausea or vomiting.    TAMSULOSIN (FLOMAX) 0.4 MG 24 HR CAPSULE    Take 1 capsule (0.4 mg) by mouth once daily for 20 days. Do not crush, chew, or split.   Previous Medications    ASCORBIC ACID (VITAMIN C) 100 MG TABLET    Take 1 tablet (100 mg) by mouth once daily.    B COMPLEX 0.4 MG TABLET    Take 1 tablet by mouth once daily.    CHOLECALCIFEROL (VITAMIN D3) 5,000 UNITS TABLET    Take by mouth once daily.    CYCLOBENZAPRINE (FLEXERIL) 10 MG TABLET    Take 1 tablet (10 mg) by mouth 2 times a day as needed for muscle spasms for up to 10 days.    DIHYDROBERBERINE (BERBERINE ES-5 ORAL)    Take by mouth.    LIDOCAINE (LIDODERM) 5 % PATCH    Place 1 patch over 12 hours on the skin once daily. Remove & discard patch within 12 hours or as directed by MD.    MEDROXYPROGESTERONE (DEPO-PROVERA) 150 MG/ML INJECTION    Inject 1 mL (150 mg) into the muscle every 12 weeks.    NAPROXEN (NAPROSYN) 500 MG TABLET    Take 1 tablet (500 mg) by mouth 2 times a day as needed.    TURMERIC ORAL    Take by mouth.   Modified Medications    No medications on file   Discontinued Medications    No medications on file      The patient’s home medications have been reviewed.    Allergies: Patient has no known allergies.    -------------------------------------------------- RESULTS  -------------------------------------------------  All laboratory and radiology results have been personally reviewed by myself   LABS:  Labs Reviewed   COMPREHENSIVE METABOLIC PANEL - Abnormal       Result Value    Glucose 102 (*)     Sodium 137      Potassium 3.6      Chloride 102      Bicarbonate 25      Anion Gap 14      Urea Nitrogen 13      Creatinine 0.84      eGFR 86      Calcium 9.4      Albumin 4.5      Alkaline Phosphatase 71      Total Protein 7.3      AST 22      Bilirubin, Total 0.5      ALT 18     URINALYSIS WITH REFLEX CULTURE AND MICROSCOPIC - Abnormal    Color, Urine Light-Yellow      Appearance, Urine Clear      Specific Gravity, Urine 1.012      pH, Urine 6.0      Protein, Urine NEGATIVE      Glucose, Urine Normal      Blood, Urine 0.5 (2+) (*)     Ketones, Urine NEGATIVE      Bilirubin, Urine NEGATIVE      Urobilinogen, Urine Normal      Nitrite, Urine NEGATIVE      Leukocyte Esterase, Urine NEGATIVE     URINALYSIS MICROSCOPIC WITH REFLEX CULTURE - Abnormal    WBC, Urine 1-5      RBC, Urine 11-20 (*)     Squamous Epithelial Cells, Urine 1-9 (SPARSE)      Budding Yeast, Urine PRESENT (*)     Mucus, Urine FEW      Hyaline Casts, Urine 2+ (*)    LIPASE - Normal    Lipase 25      Narrative:     Venipuncture immediately after or during the administration of Metamizole may lead to falsely low results. Testing should be performed immediately prior to Metamizole dosing.   HCG, URINE, QUALITATIVE - Normal    HCG, Urine NEGATIVE     CBC WITH AUTO DIFFERENTIAL    WBC 10.2      nRBC 0.0      RBC 4.25      Hemoglobin 12.9      Hematocrit 38.6      MCV 91      MCH 30.4      MCHC 33.4      RDW 12.2      Platelets 308      Neutrophils % 74.3      Immature Granulocytes %, Automated 0.6      Lymphocytes % 17.6      Monocytes % 5.8      Eosinophils % 1.1      Basophils % 0.6      Neutrophils Absolute 7.54      Immature Granulocytes Absolute, Automated 0.06      Lymphocytes Absolute 1.79      Monocytes Absolute  "0.59      Eosinophils Absolute 0.11      Basophils Absolute 0.06     URINALYSIS WITH REFLEX CULTURE AND MICROSCOPIC    Narrative:     The following orders were created for panel order Urinalysis with Reflex Culture and Microscopic.  Procedure                               Abnormality         Status                     ---------                               -----------         ------                     Urinalysis with Reflex C...[[id number]]  Abnormal            Final result               Extra Urine Gray Tube[[id number]]                            In process                   Please view results for these tests on the individual orders.   EXTRA URINE GRAY TUBE         RADIOLOGY:  Interpreted by Radiologist.  CT abdomen pelvis wo IV contrast   Final Result   3 mm calculus at the left ureterovesical junction with mild left   hydronephrosis.                  MACRO:   None        Signed by: Pennie Montes 6/30/2025 7:47 PM   Dictation workstation:   [id number]          Encounter Date: 05/27/25   ECG 12 Lead   Result Value    Ventricular Rate 82    Atrial Rate 82    DE Interval 144    QRS Duration 76    QT Interval 360    QTC Calculation(Bazett) 420    P Axis 77    R Axis 81    T Axis 63    QRS Count 14    Q Onset 221    P Onset 149    P Offset 208    T Offset 401    QTC Fredericia 399    Narrative    Normal sinus rhythm  Normal ECG    Confirmed by Brant Luna ([id number]) on 6/1/2025 8:24:55 PM     ------------------------- NURSING NOTES AND VITALS REVIEWED ---------------------------   The nursing notes within the ED encounter and vital signs as below have been reviewed.   BP (!) 155/95 (BP Location: Right arm, Patient Position: Sitting)   Pulse 78   Temp 36.3 °C (97.4 °F) (Temporal)   Resp 16   Ht 1.727 m (5' 8\")   Wt 88.5 kg (195 lb)   SpO2 99%   BMI 29.65 kg/m²   Oxygen Saturation Interpretation: Normal      ---------------------------------------------------PHYSICAL " EXAM--------------------------------------  Physical Exam  Vitals and nursing note reviewed.   Constitutional:       General: She is not in acute distress.     Appearance: Normal appearance. She is not ill-appearing, toxic-appearing or diaphoretic.   HENT:      Head: Normocephalic.      Nose: Nose normal.      Mouth/Throat:      Mouth: Mucous membranes are moist.      Pharynx: Oropharynx is clear. No oropharyngeal exudate or posterior oropharyngeal erythema.   Eyes:      General: No scleral icterus.     Extraocular Movements: Extraocular movements intact.      Pupils: Pupils are equal, round, and reactive to light.   Cardiovascular:      Rate and Rhythm: Normal rate and regular rhythm.      Pulses: Normal pulses.      Heart sounds: Normal heart sounds. No murmur heard.  Pulmonary:      Effort: Pulmonary effort is normal. No respiratory distress.      Breath sounds: Normal breath sounds. No wheezing, rhonchi or rales.   Chest:      Chest wall: No tenderness.   Abdominal:      General: There is no distension.      Palpations: Abdomen is soft. There is no mass.      Tenderness: There is no abdominal tenderness. There is left CVA tenderness. There is no right CVA tenderness, guarding or rebound.      Hernia: No hernia is present.      Comments: Normal left flank discomfort with percussion.   Musculoskeletal:         General: No swelling, tenderness, deformity or signs of injury. Normal range of motion.      Cervical back: Normal range of motion and neck supple. No rigidity or tenderness.      Right lower leg: No edema.      Left lower leg: No edema.   Skin:     General: Skin is warm and dry.      Findings: No rash.   Neurological:      General: No focal deficit present.      Mental Status: She is alert and oriented to person, place, and time.   Psychiatric:         Mood and Affect: Mood normal.            Procedures  None  ------------------------------ ED COURSE/MEDICAL DECISION MAKING----------------------    Medical  Decision Making:   Patient was seen and examined by me.  Patient has an IV started.  Will have appropriate labs and urinalysis ordered.  Will check a pregnancy test but she says she is on the Depo shot.  I have ordered a CT of the abdomen pelvis without contrast.  Patient is currently improved after some Zofran and Toradol given at urgent care prior to arrival here.    ED Course as of 06/30/25 2025   Mon Jun 30, 2025 1933 CBC is normal [EC]   1933 Comprehensive metabolic panel is normal [EC]   1933 Urinalysis is negative for infection.  There is trace blood 11-20 red blood cells seen. [EC]   1933 Urine pregnancy test is negative. [EC]   2019 CT of the abdomen pelvis  IMPRESSION:  3 mm calculus at the left ureterovesical junction with mild left  hydronephrosis.   [EC]   2024 CT imaging shows a 3 mm calculus at the left UV junction.  Mild left hydronephrosis.    Patient we discharged home with kidney stone instructions.  Rx ketorolac 10 mg every 6 hours as needed pain  Rx Zofran 4 mg ODT tablets every 6-8 hours as needed for nausea and vomiting.  Rx tamsulosin 0.4 mg daily x 20 days. [EC]   2024 Patient given written and verbal instructions and to follow-up with urology. [EC]      ED Course User Index  [EC] Pedrito Schulz, DO         Diagnoses as of 06/30/25 2025   Calculus of distal left ureter   Renal colic on left side      Counseling:   The emergency provider has spoken with the patient and discussed today’s results, in addition to providing specific details for the plan of care and counseling regarding the diagnosis and prognosis.  Questions are answered at this time and they are agreeable with the plan.      --------------------------------- IMPRESSION AND DISPOSITION ---------------------------------        IMPRESSION  1. Calculus of distal left ureter    2. Renal colic on left side        DISPOSITION  Disposition: Discharge to home  Patient condition is fair      Billing Provider Critical Care Time: 0  minutes     Pedrito Schulz,   06/30/25 2025

## 2025-06-30 NOTE — ED TRIAGE NOTES
Pt states she has LLQ abdominal/flank pain that started last night, pt vomited around 3pm. Pt went to urgent care and received toradol and zofran IM prior to arrival.

## 2025-07-01 ENCOUNTER — TELEPHONE (OUTPATIENT)
Dept: PRIMARY CARE | Facility: CLINIC | Age: 50
End: 2025-07-01
Payer: COMMERCIAL

## 2025-07-01 LAB — HOLD SPECIMEN: NORMAL

## 2025-07-01 NOTE — TELEPHONE ENCOUNTER
Patient was seen in ER for left flank pain she was given tamsulosin, and zofran patient states she picked meds up from pharm last night and has been unable to take them even the zofran comes back up she is still hurting and can't seen to keep meds in what should she do last seen in office 3/7/25 please advise

## 2025-07-01 NOTE — TELEPHONE ENCOUNTER
I spoke to her this evening.  This morning she was having a lot of discomfort.  She felt that she could not urinate.  She felt like she had to go but could not.  The pain was worsened when she finally did urinate, she thought she saw a speck of something in the toilet.  (They did not give her screen to catch the stone) after that, pain seemed to improve right now she is not having much pain at all.  Certainly she may have passed the stone.  She is brenton let me know if the pain returns.